# Patient Record
Sex: FEMALE | Race: OTHER | HISPANIC OR LATINO | URBAN - METROPOLITAN AREA
[De-identification: names, ages, dates, MRNs, and addresses within clinical notes are randomized per-mention and may not be internally consistent; named-entity substitution may affect disease eponyms.]

---

## 2017-09-21 ENCOUNTER — EMERGENCY (EMERGENCY)
Facility: HOSPITAL | Age: 22
LOS: 1 days | Discharge: HOME | End: 2017-09-21
Attending: EMERGENCY MEDICINE
Payer: MEDICAID

## 2017-09-21 VITALS
RESPIRATION RATE: 20 BRPM | DIASTOLIC BLOOD PRESSURE: 85 MMHG | TEMPERATURE: 100 F | HEART RATE: 107 BPM | OXYGEN SATURATION: 94 % | WEIGHT: 130.07 LBS | SYSTOLIC BLOOD PRESSURE: 130 MMHG

## 2017-09-21 PROCEDURE — 99053 MED SERV 10PM-8AM 24 HR FAC: CPT

## 2017-09-21 PROCEDURE — 99285 EMERGENCY DEPT VISIT HI MDM: CPT | Mod: 25

## 2017-09-22 VITALS
SYSTOLIC BLOOD PRESSURE: 130 MMHG | OXYGEN SATURATION: 100 % | RESPIRATION RATE: 20 BRPM | HEART RATE: 102 BPM | TEMPERATURE: 100 F | DIASTOLIC BLOOD PRESSURE: 83 MMHG

## 2017-09-22 PROCEDURE — T1013: CPT

## 2017-09-22 PROCEDURE — 94640 AIRWAY INHALATION TREATMENT: CPT

## 2017-09-22 PROCEDURE — 71046 X-RAY EXAM CHEST 2 VIEWS: CPT

## 2017-09-22 PROCEDURE — 71020: CPT | Mod: 26

## 2017-09-22 PROCEDURE — 99284 EMERGENCY DEPT VISIT MOD MDM: CPT | Mod: 25

## 2017-09-22 RX ORDER — ALBUTEROL 90 UG/1
2 AEROSOL, METERED ORAL
Qty: 1 | Refills: 0 | OUTPATIENT
Start: 2017-09-22 | End: 2017-10-22

## 2017-09-22 RX ORDER — AZITHROMYCIN 500 MG/1
1 TABLET, FILM COATED ORAL
Qty: 4 | Refills: 0 | OUTPATIENT
Start: 2017-09-22 | End: 2017-09-26

## 2017-09-22 RX ORDER — IPRATROPIUM/ALBUTEROL SULFATE 18-103MCG
3 AEROSOL WITH ADAPTER (GRAM) INHALATION ONCE
Qty: 0 | Refills: 0 | Status: COMPLETED | OUTPATIENT
Start: 2017-09-22 | End: 2017-09-22

## 2017-09-22 RX ORDER — ACETAMINOPHEN 500 MG
650 TABLET ORAL ONCE
Qty: 0 | Refills: 0 | Status: COMPLETED | OUTPATIENT
Start: 2017-09-22 | End: 2017-09-22

## 2017-09-22 RX ORDER — AZITHROMYCIN 500 MG/1
500 TABLET, FILM COATED ORAL ONCE
Qty: 0 | Refills: 0 | Status: COMPLETED | OUTPATIENT
Start: 2017-09-22 | End: 2017-09-22

## 2017-09-22 RX ADMIN — Medication 3 MILLILITER(S): at 02:31

## 2017-09-22 RX ADMIN — Medication 60 MILLIGRAM(S): at 02:31

## 2017-09-22 RX ADMIN — AZITHROMYCIN 500 MILLIGRAM(S): 500 TABLET, FILM COATED ORAL at 03:47

## 2017-09-22 RX ADMIN — Medication 650 MILLIGRAM(S): at 02:31

## 2017-09-22 RX ADMIN — Medication 3 MILLILITER(S): at 03:47

## 2017-09-22 NOTE — ED PROVIDER NOTE - ATTENDING CONTRIBUTION TO CARE
22 yo F c/o URI s/s with chest tightness and prod brown sputum and fever .  Pt with hx of asthma as a child.  No exposures.  On exam lungs with few scattered exp wheezes. CXR JASON.  Pt improved with neb.  Rx Albuterol MDI, Z-zoltan and po steroids with f/u PMD/Dr. hicks

## 2017-09-22 NOTE — ED PROVIDER NOTE - OBJECTIVE STATEMENT
Pt is a 22yo female with pmhx of asthma c/o chest tightness x today. pt reports when she breaths she has chest tightness without palpitations or cp. pt reports she has had flu like symptoms consisting of body aches, cough with brown sputum. Pt is a 20yo female with pmhx of asthma c/o chest tightness x today. pt reports when she breaths she has chest tightness without palpitations or cp. pt reports she has had flu like symptoms consisting of body aches, cough with brown sputum and fever. pt reports she took nyquil for symptoms. pt denies chills, rash, cp, sob, recent travel, hx of DVT/PE, leg or calf swelling. nkda

## 2017-09-22 NOTE — ED PROVIDER NOTE - PROGRESS NOTE DETAILS
pt improved. lungs CTABL. will rx azithro, prednisone, albuterol. pt advised to follow up with pmd. pt verbalized understanding and agreement with plan and dx will dc

## 2017-09-22 NOTE — ED PROVIDER NOTE - CARDIAC, MLM
+ chest wall tenderness. Normal rate, regular rhythm.  Heart sounds S1, S2.  No murmurs, rubs or gallops.

## 2018-04-04 NOTE — ED PROVIDER NOTE - TIMING
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    PHQ-9 4/7/2017   Total Score 0   Q9: Suicide Ideation Not at all     JESSICA-7 SCORE 4/7/2017   Total Score 0     PHQ-9  English  PHQ-9   Any Language  JESSICA-7  Suicide Assessment Five-step Evaluation and Treatment (SAFE-T)    Problem list and histories reviewed & adjusted, as indicated.  Additional history: as documented    Patient Active Problem List   Diagnosis     ACP (advance care planning)     Tobacco use disorder     Migraine without aura and without status migrainosus, not intractable     Gastroesophageal reflux disease, esophagitis presence not specified     Irritable bowel syndrome with both constipation and diarrhea     Family history of heart disease in female family member before age 65     Family history of heart disease in male family member before age 55     Past Surgical History:   Procedure Laterality Date     ORTHOPEDIC SURGERY  2002    ankle surgery- bone spur removal       Social History   Substance Use Topics     Smoking status: Current Every Day Smoker     Packs/day: 0.50     Years: 15.00     Types: Cigarettes     Smokeless tobacco: Never Used     Alcohol use No     Family History   Problem Relation Age of Onset      "HEART DISEASE Mother      8 stents; onset 40s?     Chronic Obstructive Pulmonary Disease Mother      CANCER Father      mouth and throat cancer     HEART DISEASE Brother      stents; age 37; occlusive disease     Bladder Cancer Brother            Reviewed and updated as needed this visit by clinical staff  Tobacco  Allergies  Meds  Med Hx  Surg Hx  Fam Hx  Soc Hx      Reviewed and updated as needed this visit by Provider         ROS:  CONSTITUTIONAL:NEGATIVE for fever, chills, change in weight  PSYCHIATRIC: POSITIVE for as above    OBJECTIVE:     /80 (BP Location: Right arm, Patient Position: Sitting, Cuff Size: Adult Regular)  Pulse 84  Temp 97.5  F (36.4  C) (Tympanic)  Resp 12  Ht 5' 3.5\" (1.613 m)  Wt 183 lb 6.4 oz (83.2 kg)  SpO2 98%  BMI 31.98 kg/m2  Body mass index is 31.98 kg/(m^2).  GENERAL: alert, no distress and over weight  RESP: lungs clear to auscultation - no rales, rhonchi or wheezes  CV: regular rate and rhythm, normal S1 S2, no S3 or S4, no murmur, click or rub, no peripheral edema and peripheral pulses strong  PSYCH: mentation appears normal, affect normal/bright      ASSESSMENT/PLAN:   (F33.9) Episode of recurrent major depressive disorder, unspecified depression episode severity (H)  (primary encounter diagnosis)  Plan: sertraline (ZOLOFT) 50 MG tablet, MENTAL HEALTH        REFERRAL  - Adult; Outpatient Treatment;         Individual/Couples/Family/Group Therapy/Health         Psychology; Range: [facility] [phone]; We will         contact you to schedule the appointment or         please call ...    (F41.9) Anxiety  Plan: sertraline (ZOLOFT) 50 MG tablet, MENTAL HEALTH        REFERRAL  - Adult; Outpatient Treatment;         Individual/Couples/Family/Group Therapy/Health         Psychology; Range: [facility] [phone]; We will         contact you to schedule the appointment or     "     please call ...      (F17.200) Tobacco use disorder  (Z71.6) Encounter for tobacco use cessation counseling      (G43.009) Migraine without aura and without status migrainosus, not intractable  Comment: asked for refill at close of visit - done  Plan: SUMAtriptan (IMITREX) 100 MG tablet    Patient Instructions   Start Zoloft 25 mg daily increasing to 50 mg after 1-2 weeks if tolerating and appropriate.  Referral for counseling services here.  See list below as well.  Follow up 1 month, sooner if concerns.    Psychologists/ counselors  [place]  [phone]  Dr. Harvey Chavez [phone]  [facility]  [phone]  [facility] [phone]  Octavio Garcia  [phone]   [facility]  [phone]  (kids)  [facility] [phone]  (teens)  [facility]   [phone]  Counseling  [facility] [phone]  [facility] [phone]  [facility]      [phone]  [facility] [phone]  [facility]  [phone]  [facility]     [phone]   [facility] counseling [phone]  Fabby Mojo  [phone]  Ced Coyne [phone]  Cara Davis [phone]  [facility]     [phone]  [facility]     [phone]  Harpal Flynn  [phone]  [facility]     [phone]  [facility] Dr. HARPAL Zavala     [phone]  [facility]     [phone]                        Kendra Bailey MD  [facility]   sudden onset

## 2018-08-17 ENCOUNTER — EMERGENCY (EMERGENCY)
Facility: HOSPITAL | Age: 23
LOS: 1 days | Discharge: OTHER INSTITUTION | End: 2018-08-17
Attending: EMERGENCY MEDICINE
Payer: COMMERCIAL

## 2018-08-17 ENCOUNTER — INPATIENT (INPATIENT)
Facility: HOSPITAL | Age: 23
LOS: 9 days | Discharge: HOME | End: 2018-08-27
Attending: PSYCHIATRY & NEUROLOGY | Admitting: PSYCHIATRY & NEUROLOGY
Payer: COMMERCIAL

## 2018-08-17 VITALS — HEIGHT: 68 IN | WEIGHT: 139.99 LBS

## 2018-08-17 VITALS
HEART RATE: 62 BPM | OXYGEN SATURATION: 100 % | TEMPERATURE: 97 F | SYSTOLIC BLOOD PRESSURE: 125 MMHG | RESPIRATION RATE: 18 BRPM | DIASTOLIC BLOOD PRESSURE: 89 MMHG

## 2018-08-17 DIAGNOSIS — F33.2 MAJOR DEPRESSIVE DISORDER, RECURRENT SEVERE WITHOUT PSYCHOTIC FEATURES: ICD-10-CM

## 2018-08-17 DIAGNOSIS — F33.9 MAJOR DEPRESSIVE DISORDER, RECURRENT, UNSPECIFIED: ICD-10-CM

## 2018-08-17 PROBLEM — J45.909 UNSPECIFIED ASTHMA, UNCOMPLICATED: Chronic | Status: ACTIVE | Noted: 2017-09-22

## 2018-08-17 LAB
ALBUMIN SERPL ELPH-MCNC: 4.8 G/DL — SIGNIFICANT CHANGE UP (ref 3.3–5.2)
ALP SERPL-CCNC: 45 U/L — SIGNIFICANT CHANGE UP (ref 40–120)
ALT FLD-CCNC: 14 U/L — SIGNIFICANT CHANGE UP
AMPHET UR-MCNC: NEGATIVE — SIGNIFICANT CHANGE UP
ANION GAP SERPL CALC-SCNC: 14 MMOL/L — SIGNIFICANT CHANGE UP (ref 5–17)
APAP SERPL-MCNC: <7.5 UG/ML — LOW (ref 10–26)
AST SERPL-CCNC: 15 U/L — SIGNIFICANT CHANGE UP
BARBITURATES UR SCN-MCNC: NEGATIVE — SIGNIFICANT CHANGE UP
BASOPHILS # BLD AUTO: 0 K/UL — SIGNIFICANT CHANGE UP (ref 0–0.2)
BASOPHILS NFR BLD AUTO: 0.2 % — SIGNIFICANT CHANGE UP (ref 0–2)
BENZODIAZ UR-MCNC: NEGATIVE — SIGNIFICANT CHANGE UP
BILIRUB SERPL-MCNC: 0.5 MG/DL — SIGNIFICANT CHANGE UP (ref 0.4–2)
BUN SERPL-MCNC: 12 MG/DL — SIGNIFICANT CHANGE UP (ref 8–20)
CALCIUM SERPL-MCNC: 9.3 MG/DL — SIGNIFICANT CHANGE UP (ref 8.6–10.2)
CHLORIDE SERPL-SCNC: 102 MMOL/L — SIGNIFICANT CHANGE UP (ref 98–107)
CO2 SERPL-SCNC: 23 MMOL/L — SIGNIFICANT CHANGE UP (ref 22–29)
COCAINE METAB.OTHER UR-MCNC: NEGATIVE — SIGNIFICANT CHANGE UP
CREAT SERPL-MCNC: 0.72 MG/DL — SIGNIFICANT CHANGE UP (ref 0.5–1.3)
EOSINOPHIL # BLD AUTO: 0.1 K/UL — SIGNIFICANT CHANGE UP (ref 0–0.5)
EOSINOPHIL NFR BLD AUTO: 1.6 % — SIGNIFICANT CHANGE UP (ref 0–6)
ETHANOL SERPL-MCNC: <10 MG/DL — SIGNIFICANT CHANGE UP
GLUCOSE SERPL-MCNC: 114 MG/DL — SIGNIFICANT CHANGE UP (ref 70–115)
HCG UR QL: NEGATIVE — SIGNIFICANT CHANGE UP
HCT VFR BLD CALC: 37.8 % — SIGNIFICANT CHANGE UP (ref 37–47)
HGB BLD-MCNC: 12.4 G/DL — SIGNIFICANT CHANGE UP (ref 12–16)
LYMPHOCYTES # BLD AUTO: 2.7 K/UL — SIGNIFICANT CHANGE UP (ref 1–4.8)
LYMPHOCYTES # BLD AUTO: 32.5 % — SIGNIFICANT CHANGE UP (ref 20–55)
MCHC RBC-ENTMCNC: 26.2 PG — LOW (ref 27–31)
MCHC RBC-ENTMCNC: 32.8 G/DL — SIGNIFICANT CHANGE UP (ref 32–36)
MCV RBC AUTO: 79.7 FL — LOW (ref 81–99)
METHADONE UR-MCNC: NEGATIVE — SIGNIFICANT CHANGE UP
MONOCYTES # BLD AUTO: 0.7 K/UL — SIGNIFICANT CHANGE UP (ref 0–0.8)
MONOCYTES NFR BLD AUTO: 8.3 % — SIGNIFICANT CHANGE UP (ref 3–10)
NEUTROPHILS # BLD AUTO: 4.8 K/UL — SIGNIFICANT CHANGE UP (ref 1.8–8)
NEUTROPHILS NFR BLD AUTO: 57.3 % — SIGNIFICANT CHANGE UP (ref 37–73)
OPIATES UR-MCNC: NEGATIVE — SIGNIFICANT CHANGE UP
PCP SPEC-MCNC: SIGNIFICANT CHANGE UP
PCP UR-MCNC: NEGATIVE — SIGNIFICANT CHANGE UP
PLATELET # BLD AUTO: 278 K/UL — SIGNIFICANT CHANGE UP (ref 150–400)
POTASSIUM SERPL-MCNC: 3.5 MMOL/L — SIGNIFICANT CHANGE UP (ref 3.5–5.3)
POTASSIUM SERPL-SCNC: 3.5 MMOL/L — SIGNIFICANT CHANGE UP (ref 3.5–5.3)
PROT SERPL-MCNC: 7.9 G/DL — SIGNIFICANT CHANGE UP (ref 6.6–8.7)
RBC # BLD: 4.74 M/UL — SIGNIFICANT CHANGE UP (ref 4.4–5.2)
RBC # FLD: 14.8 % — SIGNIFICANT CHANGE UP (ref 11–15.6)
SALICYLATES SERPL-MCNC: <0.6 MG/DL — LOW (ref 10–20)
SODIUM SERPL-SCNC: 139 MMOL/L — SIGNIFICANT CHANGE UP (ref 135–145)
THC UR QL: NEGATIVE — SIGNIFICANT CHANGE UP
WBC # BLD: 8.3 K/UL — SIGNIFICANT CHANGE UP (ref 4.8–10.8)
WBC # FLD AUTO: 8.3 K/UL — SIGNIFICANT CHANGE UP (ref 4.8–10.8)

## 2018-08-17 PROCEDURE — 99285 EMERGENCY DEPT VISIT HI MDM: CPT

## 2018-08-17 PROCEDURE — 80053 COMPREHEN METABOLIC PANEL: CPT

## 2018-08-17 PROCEDURE — 80307 DRUG TEST PRSMV CHEM ANLYZR: CPT

## 2018-08-17 PROCEDURE — 36415 COLL VENOUS BLD VENIPUNCTURE: CPT

## 2018-08-17 PROCEDURE — 93010 ELECTROCARDIOGRAM REPORT: CPT

## 2018-08-17 PROCEDURE — 85027 COMPLETE CBC AUTOMATED: CPT

## 2018-08-17 PROCEDURE — 81025 URINE PREGNANCY TEST: CPT

## 2018-08-17 PROCEDURE — 93005 ELECTROCARDIOGRAM TRACING: CPT

## 2018-08-17 PROCEDURE — T1013: CPT

## 2018-08-17 RX ORDER — HALOPERIDOL DECANOATE 100 MG/ML
5 INJECTION INTRAMUSCULAR ONCE
Qty: 0 | Refills: 0 | Status: DISCONTINUED | OUTPATIENT
Start: 2018-08-17 | End: 2018-08-27

## 2018-08-17 RX ORDER — HALOPERIDOL DECANOATE 100 MG/ML
5 INJECTION INTRAMUSCULAR EVERY 6 HOURS
Qty: 0 | Refills: 0 | Status: DISCONTINUED | OUTPATIENT
Start: 2018-08-17 | End: 2018-08-27

## 2018-08-17 RX ORDER — QUETIAPINE FUMARATE 200 MG/1
100 TABLET, FILM COATED ORAL AT BEDTIME
Qty: 0 | Refills: 0 | Status: DISCONTINUED | OUTPATIENT
Start: 2018-08-17 | End: 2018-08-19

## 2018-08-17 NOTE — ED BEHAVIORAL HEALTH ASSESSMENT NOTE - SUMMARY
23 y/o F, domiciled with family, terminated from employment 2 days ago, unclear hx of psychiatric d/o, previously admitted to [facility] in 2013 for SI and insomnia, with pmhx of asthma and ovarian tumor, denies legal hx/substance abuse, denies abuse/trauma, presents to the ED c/o aggressive and bizarre behavior. She endorses feeling very stressed at home and has been considering suicide, without a clear plan/intent. She denies avh. Family reports irritability/aggression/insomnia, pt talking to herself. Will benefit from inpatient hospitalization on DOCS.

## 2018-08-17 NOTE — ED ADULT NURSE NOTE - NSIMPLEMENTINTERV_GEN_ALL_ED
Implemented All Universal Safety Interventions:  [place] to call system. Call bell, personal items and telephone within reach. Instruct patient to call for assistance. Room bathroom lighting operational. Non-slip footwear when patient is off stretcher. Physically safe environment: no spills, clutter or unnecessary equipment. Stretcher in lowest position, wheels locked, appropriate side rails in place.

## 2018-08-17 NOTE — ED BEHAVIORAL HEALTH ASSESSMENT NOTE - RISK ASSESSMENT
high - pt with impaired reasoning, global insomnia, irritability, aggression toward family, not currently in treattment
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
## 2018-08-17 NOTE — ED STATDOCS - MEDICAL DECISION MAKING DETAILS
Pt with aggressive behavior and difficulty sleeping, will obtain labs, urine tox, and  consult.  Reeval

## 2018-08-17 NOTE — ED ADULT NURSE REASSESSMENT NOTE - REASSESS COMMUNICATION
family informed/Pt  came to ER  requesting to know sitatus with his wife. Informed through  Pt will be going to inpatient however no bed has been found yet. He will be made aware when situation changes

## 2018-08-17 NOTE — ED BEHAVIORAL HEALTH NOTE - BEHAVIORAL HEALTH NOTE
TRINYNote: pt accepted by Dr Jonatan Hua3 at [facility] on a DOCS status. [facility] called(Betty) ,marnie ETA 17:30 .  17:45 [facility] crew unable to take pt given that their stretcher did not have the buckle guards in place. As per Franki (marnie crew) closest marnie is at [facility]. They will return to p/u pt aprox. in one hour. Worker left message informing SWr Sugar about the delay.   Auth needed for transfer ,worker will attempt to obtain it asap. SW to follow.

## 2018-08-17 NOTE — ED BEHAVIORAL HEALTH ASSESSMENT NOTE - DESCRIPTION
tearful, depressed see medical hpi lives with family, recently terminated from job at [employer]

## 2018-08-17 NOTE — ED BEHAVIORAL HEALTH ASSESSMENT NOTE - DETAILS
pending bed availability family with  see hpi generalized aggression, general feeling of irritability

## 2018-08-17 NOTE — ED STATDOCS - PROGRESS NOTE DETAILS
psych evaluated the patient, will transfer the patient to [facility], accepted by

## 2018-08-17 NOTE — ED ADULT NURSE REASSESSMENT NOTE - CONDITION
[facility] transport team arrived to take pt to [facility]. They arrived without buckle guard and were unable to transport pt. Another crew will need to  be sent to transport pt. Pt interacting with other peers/unchanged

## 2018-08-18 VITALS — HEIGHT: 68 IN | WEIGHT: 128.09 LBS | TEMPERATURE: 98 F

## 2018-08-18 LAB
CHOLEST SERPL-MCNC: 139 MG/DL — SIGNIFICANT CHANGE UP (ref 120–199)
HDLC SERPL-MCNC: 56 MG/DL — SIGNIFICANT CHANGE UP (ref 45–65)
LIPID PNL WITH DIRECT LDL SERPL: 77 MG/DL — SIGNIFICANT CHANGE UP
TRIGL SERPL-MCNC: 79 MG/DL — SIGNIFICANT CHANGE UP (ref 10–149)
TSH SERPL-MCNC: 1.66 UIU/ML — SIGNIFICANT CHANGE UP (ref 0.27–4.2)

## 2018-08-18 PROCEDURE — 99232 SBSQ HOSP IP/OBS MODERATE 35: CPT

## 2018-08-18 RX ADMIN — QUETIAPINE FUMARATE 100 MILLIGRAM(S): 200 TABLET, FILM COATED ORAL at 21:00

## 2018-08-18 NOTE — CHART NOTE - NSCHARTNOTEFT_GEN_A_CORE
Screening Medical Evaluation  Patient Admitted from: [facility] ED    [facility] admitting diagnosis: Recurrent major depressive disorder    PAST MEDICAL & SURGICAL HISTORY:  Asthma  Ovarian tumor: removed 5 years ago  No significant past surgical history        Allergies    No Known Allergies    Intolerances        Social History:     FAMILY HISTORY:      MEDICATIONS  (STANDING):  QUEtiapine 100 milliGRAM(s) Oral at bedtime    MEDICATIONS  (PRN):  haloperidol     Tablet 5 milliGRAM(s) Oral every 6 hours PRN Agitation  haloperidol    Injectable 5 milliGRAM(s) IntraMuscular once PRN Agitation  LORazepam     Tablet 2 milliGRAM(s) Oral every 6 hours PRN Agitation  LORazepam   Injectable 2 milliGRAM(s) IntraMuscular once PRN Agitation      Vital Signs Last 24 Hrs  T(C): 36.6 (18 Aug 2018 19:09), Max: 36.6 (18 Aug 2018 19:09)  T(F): 97.9 (18 Aug 2018 19:09), Max: 97.9 (18 Aug 2018 19:09)  HR: 82 (18 Aug 2018 19:09)  BP: 135/84 (18 Aug 2018 19:09)  RR: 18 (18 Aug 2018 19:09)  SpO2: --  CAPILLARY BLOOD GLUCOSE            PHYSICAL EXAM:  GENERAL: NAD, well-developed  HEAD:  Atraumatic, Normocephalic  EYES: EOMI, PERRLA, conjunctiva and sclera clear  NECK: Supple.  CHEST/LUNG: Clear to auscultation bilaterally; No wheezes noted.  HEART: Regular rate and rhythm; +s1 and +s2; No murmurs, rubs, or gallops  ABDOMEN: Soft, Nontender, Nondistended; Normoactive Bowel sounds present  EXTREMITIES:  2+ Peripheral Pulses, No cyanosis, or edema  PSYCH: AAOx3  NEUROLOGY: non-focal  SKIN: No rashes or lesions    LABS:                        12.4   8.3   )-----------( 278      ( 17 Aug 2018 13:53 )             37.8     08-17    139  |  102  |  12.0  ----------------------------<  114  3.5   |  23.0  |  0.72    Ca    9.3      17 Aug 2018 13:53    TPro  7.9  /  Alb  4.8  /  TBili  0.5  /  DBili  x   /  AST  15  /  ALT  14  /  AlkPhos  45  08-17              RADIOLOGY & ADDITIONAL TESTS:    Assessment and Plan:  22 year old female presenting from [facility] ED to [facility] with admitting diagnosis of Recurrent major depressive disorder with PMH of asthma (does not recall last use of inhaler, never had been intubated) and HTN. Denies any medical concerns at this time. Denies any fever, chills, headache, chest pain, SOB, abdominal pain, N/V/D/C.  1)	Recurrent major depressive disorder: Follow care plan as per primary psych team.

## 2018-08-19 PROCEDURE — 99232 SBSQ HOSP IP/OBS MODERATE 35: CPT

## 2018-08-19 RX ORDER — QUETIAPINE FUMARATE 200 MG/1
150 TABLET, FILM COATED ORAL AT BEDTIME
Qty: 0 | Refills: 0 | Status: DISCONTINUED | OUTPATIENT
Start: 2018-08-19 | End: 2018-08-20

## 2018-08-19 RX ADMIN — QUETIAPINE FUMARATE 150 MILLIGRAM(S): 200 TABLET, FILM COATED ORAL at 21:20

## 2018-08-20 PROCEDURE — 99232 SBSQ HOSP IP/OBS MODERATE 35: CPT

## 2018-08-20 RX ORDER — QUETIAPINE FUMARATE 200 MG/1
200 TABLET, FILM COATED ORAL AT BEDTIME
Qty: 0 | Refills: 0 | Status: DISCONTINUED | OUTPATIENT
Start: 2018-08-20 | End: 2018-08-21

## 2018-08-20 RX ADMIN — QUETIAPINE FUMARATE 200 MILLIGRAM(S): 200 TABLET, FILM COATED ORAL at 21:49

## 2018-08-21 PROCEDURE — 99232 SBSQ HOSP IP/OBS MODERATE 35: CPT

## 2018-08-21 RX ORDER — QUETIAPINE FUMARATE 200 MG/1
250 TABLET, FILM COATED ORAL AT BEDTIME
Qty: 0 | Refills: 0 | Status: DISCONTINUED | OUTPATIENT
Start: 2018-08-21 | End: 2018-08-22

## 2018-08-21 RX ADMIN — QUETIAPINE FUMARATE 250 MILLIGRAM(S): 200 TABLET, FILM COATED ORAL at 21:15

## 2018-08-22 PROCEDURE — 99233 SBSQ HOSP IP/OBS HIGH 50: CPT

## 2018-08-22 RX ORDER — QUETIAPINE FUMARATE 200 MG/1
300 TABLET, FILM COATED ORAL AT BEDTIME
Qty: 0 | Refills: 0 | Status: DISCONTINUED | OUTPATIENT
Start: 2018-08-22 | End: 2018-08-24

## 2018-08-22 RX ADMIN — QUETIAPINE FUMARATE 300 MILLIGRAM(S): 200 TABLET, FILM COATED ORAL at 20:38

## 2018-08-23 PROCEDURE — 99231 SBSQ HOSP IP/OBS SF/LOW 25: CPT

## 2018-08-23 RX ADMIN — QUETIAPINE FUMARATE 300 MILLIGRAM(S): 200 TABLET, FILM COATED ORAL at 21:39

## 2018-08-24 PROCEDURE — 99232 SBSQ HOSP IP/OBS MODERATE 35: CPT

## 2018-08-24 RX ORDER — QUETIAPINE FUMARATE 200 MG/1
350 TABLET, FILM COATED ORAL AT BEDTIME
Qty: 0 | Refills: 0 | Status: DISCONTINUED | OUTPATIENT
Start: 2018-08-24 | End: 2018-08-27

## 2018-08-24 RX ADMIN — QUETIAPINE FUMARATE 350 MILLIGRAM(S): 200 TABLET, FILM COATED ORAL at 21:53

## 2018-08-25 RX ADMIN — QUETIAPINE FUMARATE 350 MILLIGRAM(S): 200 TABLET, FILM COATED ORAL at 21:03

## 2018-08-26 RX ADMIN — QUETIAPINE FUMARATE 350 MILLIGRAM(S): 200 TABLET, FILM COATED ORAL at 21:12

## 2018-08-27 VITALS — HEART RATE: 96 BPM | SYSTOLIC BLOOD PRESSURE: 119 MMHG | DIASTOLIC BLOOD PRESSURE: 78 MMHG | TEMPERATURE: 97 F

## 2018-08-27 PROCEDURE — 99239 HOSP IP/OBS DSCHRG MGMT >30: CPT

## 2018-08-27 RX ORDER — QUETIAPINE FUMARATE 200 MG/1
400 TABLET, FILM COATED ORAL AT BEDTIME
Qty: 0 | Refills: 0 | Status: DISCONTINUED | OUTPATIENT
Start: 2018-08-27 | End: 2018-08-27

## 2018-08-27 RX ORDER — QUETIAPINE FUMARATE 200 MG/1
1 TABLET, FILM COATED ORAL
Qty: 30 | Refills: 0 | OUTPATIENT
Start: 2018-08-27 | End: 2018-09-25

## 2020-03-24 PROBLEM — Z00.00 ENCOUNTER FOR PREVENTIVE HEALTH EXAMINATION: Status: ACTIVE | Noted: 2020-03-24

## 2020-04-03 ENCOUNTER — APPOINTMENT (OUTPATIENT)
Dept: GASTROENTEROLOGY | Facility: CLINIC | Age: 25
End: 2020-04-03
Payer: COMMERCIAL

## 2020-06-23 ENCOUNTER — APPOINTMENT (OUTPATIENT)
Dept: GASTROENTEROLOGY | Facility: CLINIC | Age: 25
End: 2020-06-23
Payer: COMMERCIAL

## 2020-06-23 VITALS
SYSTOLIC BLOOD PRESSURE: 125 MMHG | DIASTOLIC BLOOD PRESSURE: 90 MMHG | RESPIRATION RATE: 14 BRPM | TEMPERATURE: 98.3 F | HEART RATE: 72 BPM | WEIGHT: 178 LBS | HEIGHT: 68 IN | BODY MASS INDEX: 26.98 KG/M2 | OXYGEN SATURATION: 99 %

## 2020-06-23 DIAGNOSIS — Z78.9 OTHER SPECIFIED HEALTH STATUS: ICD-10-CM

## 2020-06-23 DIAGNOSIS — K76.9 LIVER DISEASE, UNSPECIFIED: ICD-10-CM

## 2020-06-23 PROCEDURE — 99203 OFFICE O/P NEW LOW 30 MIN: CPT

## 2020-06-23 NOTE — REASON FOR VISIT
[Consultation] : a consultation visit [ Service] : provided by  Service [FreeTextEntry1] : [id number] [FreeTextEntry2] : Marilu [TWNoteComboBox1] : Stateless

## 2020-06-23 NOTE — ASSESSMENT
[FreeTextEntry1] : It is a possibility of benign liver lesion versus cystic lesion. At this time we will perform the blood work including AFP and CA 19-9 level and order an MRI of the liver. We will followup in 2 months.\par \par Geremias Krause MD\par Gastroenterology \par \par \par

## 2020-06-23 NOTE — HISTORY OF PRESENT ILLNESS
[Heartburn] : denies heartburn [Nausea] : denies nausea [Vomiting] : denies vomiting [Constipation] : denies constipation [Diarrhea] : denies diarrhea [Yellow Skin Or Eyes (Jaundice)] : denies jaundice [Abdominal Pain] : denies abdominal pain [Abdominal Swelling] : denies abdominal swelling [Rectal Pain] : denies rectal pain [de-identified] : The patient was referred for evaluation for a left liver lobe lesion. The patient has undergone regular physical examination and is known to have elevated liver tests. Then ultrasound abdomen was performed which revealed a 1.8 x 1.8 x 1.7 cm hypoechoic lesion in the left liver lobe. She was also noted to have fatty liver. She has no complaints otherwise. No family history of any liver disease. She denies any alcohol consumption. No tobacco use. Her recent liver tests are unremarkable. The prior AST was 49 which is 23 now. ALT was 79 and 32 now. I do not have any other blood work.No oral contraceptive medications.

## 2020-06-23 NOTE — PHYSICAL EXAM
[General Appearance - Alert] : alert [General Appearance - In No Acute Distress] : in no acute distress [Sclera] : the sclera and conjunctiva were normal [PERRL With Normal Accommodation] : pupils were equal in size, round, and reactive to light [Extraocular Movements] : extraocular movements were intact [Outer Ear] : the ears and nose were normal in appearance [Oropharynx] : the oropharynx was normal [Neck Cervical Mass (___cm)] : no neck mass was observed [Jugular Venous Distention Increased] : there was no jugular-venous distention [Neck Appearance] : the appearance of the neck was normal [Thyroid Diffuse Enlargement] : the thyroid was not enlarged [Thyroid Nodule] : there were no palpable thyroid nodules [Auscultation Breath Sounds / Voice Sounds] : lungs were clear to auscultation bilaterally [Heart Sounds] : normal S1 and S2 [Heart Rate And Rhythm] : heart rate was normal and rhythm regular [Murmurs] : no murmurs [Heart Sounds Gallop] : no gallops [Heart Sounds Pericardial Friction Rub] : no pericardial rub [Full Pulse] : the pedal pulses are present [Edema] : there was no peripheral edema [Cervical Lymph Nodes Enlarged Posterior Bilaterally] : posterior cervical [Axillary Lymph Nodes Enlarged Bilaterally] : axillary [Supraclavicular Lymph Nodes Enlarged Bilaterally] : supraclavicular [Cervical Lymph Nodes Enlarged Anterior Bilaterally] : anterior cervical [Femoral Lymph Nodes Enlarged Bilaterally] : femoral [Inguinal Lymph Nodes Enlarged Bilaterally] : inguinal [No CVA Tenderness] : no ~M costovertebral angle tenderness [No Spinal Tenderness] : no spinal tenderness [Abnormal Walk] : normal gait [Musculoskeletal - Swelling] : no joint swelling seen [Nail Clubbing] : no clubbing  or cyanosis of the fingernails [Motor Tone] : muscle strength and tone were normal [Skin Color & Pigmentation] : normal skin color and pigmentation [Skin Turgor] : normal skin turgor [] : no rash [No Focal Deficits] : no focal deficits [Impaired Insight] : insight and judgment were intact [Oriented To Time, Place, And Person] : oriented to person, place, and time [Affect] : the affect was normal

## 2020-06-26 ENCOUNTER — OUTPATIENT (OUTPATIENT)
Dept: OUTPATIENT SERVICES | Facility: HOSPITAL | Age: 25
LOS: 1 days | End: 2020-06-26

## 2020-06-26 ENCOUNTER — APPOINTMENT (OUTPATIENT)
Dept: MRI IMAGING | Facility: CLINIC | Age: 25
End: 2020-06-26
Payer: COMMERCIAL

## 2020-06-26 ENCOUNTER — RESULT REVIEW (OUTPATIENT)
Age: 25
End: 2020-06-26

## 2020-06-26 DIAGNOSIS — K76.9 LIVER DISEASE, UNSPECIFIED: ICD-10-CM

## 2020-06-26 PROCEDURE — 74183 MRI ABD W/O CNTR FLWD CNTR: CPT | Mod: 26

## 2020-07-07 LAB
ALBUMIN SERPL ELPH-MCNC: 4.9 G/DL
ALP BLD-CCNC: 50 U/L
ALT SERPL-CCNC: 28 U/L
ANION GAP SERPL CALC-SCNC: 13 MMOL/L
AST SERPL-CCNC: 21 U/L
BASOPHILS # BLD AUTO: 0.04 K/UL
BASOPHILS NFR BLD AUTO: 0.6 %
BILIRUB SERPL-MCNC: 0.2 MG/DL
BUN SERPL-MCNC: 13 MG/DL
CALCIUM SERPL-MCNC: 9.5 MG/DL
CANCER AG19-9 SERPL-ACNC: 8 U/ML
CEA SERPL-MCNC: 0.7 NG/ML
CHLORIDE SERPL-SCNC: 102 MMOL/L
CHOLEST SERPL-MCNC: 156 MG/DL
CHOLEST/HDLC SERPL: 3.4 RATIO
CO2 SERPL-SCNC: 23 MMOL/L
CREAT SERPL-MCNC: 0.78 MG/DL
EOSINOPHIL # BLD AUTO: 0.3 K/UL
EOSINOPHIL NFR BLD AUTO: 4.1 %
ESTIMATED AVERAGE GLUCOSE: 108 MG/DL
GLUCOSE SERPL-MCNC: 94 MG/DL
HBA1C MFR BLD HPLC: 5.4 %
HBV CORE IGG+IGM SER QL: NONREACTIVE
HBV SURFACE AB SER QL: NONREACTIVE
HBV SURFACE AG SER QL: NONREACTIVE
HCT VFR BLD CALC: 40.2 %
HCV AB SER QL: NONREACTIVE
HCV S/CO RATIO: 0.1 S/CO
HDLC SERPL-MCNC: 46 MG/DL
HEPATITIS A IGG ANTIBODY: REACTIVE
HGB BLD-MCNC: 12.8 G/DL
IMM GRANULOCYTES NFR BLD AUTO: 0.1 %
LDLC SERPL CALC-MCNC: 87 MG/DL
LYMPHOCYTES # BLD AUTO: 3.16 K/UL
LYMPHOCYTES NFR BLD AUTO: 43.7 %
MAN DIFF?: NORMAL
MCHC RBC-ENTMCNC: 27.1 PG
MCHC RBC-ENTMCNC: 31.8 GM/DL
MCV RBC AUTO: 85.2 FL
MONOCYTES # BLD AUTO: 0.59 K/UL
MONOCYTES NFR BLD AUTO: 8.2 %
NEUTROPHILS # BLD AUTO: 3.13 K/UL
NEUTROPHILS NFR BLD AUTO: 43.3 %
PLATELET # BLD AUTO: 307 K/UL
POTASSIUM SERPL-SCNC: 4.4 MMOL/L
PROT SERPL-MCNC: 7.5 G/DL
RBC # BLD: 4.72 M/UL
RBC # FLD: 13.7 %
SODIUM SERPL-SCNC: 138 MMOL/L
TRIGL SERPL-MCNC: 116 MG/DL
TSH SERPL-ACNC: 1.21 UIU/ML
WBC # FLD AUTO: 7.23 K/UL

## 2020-08-14 ENCOUNTER — APPOINTMENT (OUTPATIENT)
Dept: GASTROENTEROLOGY | Facility: CLINIC | Age: 25
End: 2020-08-14
Payer: COMMERCIAL

## 2020-08-14 VITALS
OXYGEN SATURATION: 98 % | TEMPERATURE: 97.9 F | WEIGHT: 168 LBS | SYSTOLIC BLOOD PRESSURE: 110 MMHG | DIASTOLIC BLOOD PRESSURE: 70 MMHG | BODY MASS INDEX: 25.46 KG/M2 | RESPIRATION RATE: 15 BRPM | HEART RATE: 70 BPM | HEIGHT: 68 IN

## 2020-08-14 DIAGNOSIS — K76.0 FATTY (CHANGE OF) LIVER, NOT ELSEWHERE CLASSIFIED: ICD-10-CM

## 2020-08-14 PROCEDURE — 99213 OFFICE O/P EST LOW 20 MIN: CPT

## 2020-08-14 NOTE — PHYSICAL EXAM
[General Appearance - Alert] : alert [Sclera] : the sclera and conjunctiva were normal [General Appearance - In No Acute Distress] : in no acute distress [PERRL With Normal Accommodation] : pupils were equal in size, round, and reactive to light [Extraocular Movements] : extraocular movements were intact [Outer Ear] : the ears and nose were normal in appearance [Oropharynx] : the oropharynx was normal [Neck Appearance] : the appearance of the neck was normal [Jugular Venous Distention Increased] : there was no jugular-venous distention [Neck Cervical Mass (___cm)] : no neck mass was observed [Thyroid Diffuse Enlargement] : the thyroid was not enlarged [Thyroid Nodule] : there were no palpable thyroid nodules [Auscultation Breath Sounds / Voice Sounds] : lungs were clear to auscultation bilaterally [Heart Rate And Rhythm] : heart rate was normal and rhythm regular [Heart Sounds] : normal S1 and S2 [Murmurs] : no murmurs [Heart Sounds Gallop] : no gallops [Full Pulse] : the pedal pulses are present [Heart Sounds Pericardial Friction Rub] : no pericardial rub [Edema] : there was no peripheral edema [Cervical Lymph Nodes Enlarged Anterior Bilaterally] : anterior cervical [Cervical Lymph Nodes Enlarged Posterior Bilaterally] : posterior cervical [Axillary Lymph Nodes Enlarged Bilaterally] : axillary [Supraclavicular Lymph Nodes Enlarged Bilaterally] : supraclavicular [Femoral Lymph Nodes Enlarged Bilaterally] : femoral [Inguinal Lymph Nodes Enlarged Bilaterally] : inguinal [No CVA Tenderness] : no ~M costovertebral angle tenderness [No Spinal Tenderness] : no spinal tenderness [Abnormal Walk] : normal gait [Nail Clubbing] : no clubbing  or cyanosis of the fingernails [Musculoskeletal - Swelling] : no joint swelling seen [Skin Color & Pigmentation] : normal skin color and pigmentation [Motor Tone] : muscle strength and tone were normal [Skin Turgor] : normal skin turgor [No Focal Deficits] : no focal deficits [Impaired Insight] : insight and judgment were intact [Oriented To Time, Place, And Person] : oriented to person, place, and time [Affect] : the affect was normal [Bowel Sounds] : normal bowel sounds [Abdomen Soft] : soft [Abdomen Tenderness] : non-tender [] : no hepato-splenomegaly [Abdomen Mass (___ Cm)] : no abdominal mass palpated

## 2020-08-14 NOTE — HISTORY OF PRESENT ILLNESS
[de-identified] : The patient arrived for a followup. Patient was evaluated in the past for abnormal ultrasound of the liver and evidence of fatty liver. She has no complaints. She underwent MRI of the abdomen which revealed just fatty liver. LFTs are normal. No evidence of hepatitis B or C. Lipid panel and hemoglobin A1c is normal. TSH is normal. Patient is not a smoker. She does not drink alcohol. No family history of fatty liver or liver disease.

## 2020-08-14 NOTE — ASSESSMENT
[FreeTextEntry1] : The patient is doing well. I have recommended regular exercise and weight loss. We will evaluate her in 4- 5 months and perform ultrasound of abdomen for followup of fatty liver. If she still has persistent fatty liver, I would recommend vitamin E supplementation.\par \par Geremias Krause MD\par Gastroenterology \par \par

## 2022-08-01 NOTE — ED BEHAVIORAL HEALTH ASSESSMENT NOTE - NS ED BHA ED COURSE UTILIZATION OF 1 TO 1 IN ED YN
JIHAN WITH [facility] LEFT A VOICEMAIL ON 7/28/2022  PM REGARDING NEEDING TO SCHEDULE DELIVERY OF XOLAIR FOR PATIENT.   
Returned call to [facility].  Per Lauryn delivery scheduled for 8-3-22.  
No

## 2022-12-28 ENCOUNTER — EMERGENCY (EMERGENCY)
Facility: HOSPITAL | Age: 27
LOS: 1 days | Discharge: HOME | End: 2022-12-28
Attending: EMERGENCY MEDICINE
Payer: COMMERCIAL

## 2022-12-28 VITALS
RESPIRATION RATE: 20 BRPM | TEMPERATURE: 98 F | WEIGHT: 149.91 LBS | SYSTOLIC BLOOD PRESSURE: 124 MMHG | DIASTOLIC BLOOD PRESSURE: 86 MMHG | HEIGHT: 65 IN | HEART RATE: 77 BPM | OXYGEN SATURATION: 99 %

## 2022-12-28 LAB
APPEARANCE UR: ABNORMAL
BACTERIA # UR AUTO: ABNORMAL
BILIRUB UR-MCNC: ABNORMAL
COLOR SPEC: ABNORMAL
DIFF PNL FLD: ABNORMAL
EPI CELLS # UR: SIGNIFICANT CHANGE UP
GLUCOSE UR QL: NEGATIVE — SIGNIFICANT CHANGE UP
HCG UR QL: NEGATIVE — SIGNIFICANT CHANGE UP
KETONES UR-MCNC: NEGATIVE — SIGNIFICANT CHANGE UP
LEUKOCYTE ESTERASE UR-ACNC: ABNORMAL
NITRITE UR-MCNC: POSITIVE
PH UR: 6 — SIGNIFICANT CHANGE UP (ref 5–8)
PROT UR-MCNC: 30 MG/DL
RBC CASTS # UR COMP ASSIST: >50 /HPF (ref 0–4)
SP GR SPEC: 1 — LOW (ref 1.01–1.02)
UROBILINOGEN FLD QL: 4
WBC UR QL: SIGNIFICANT CHANGE UP /HPF (ref 0–5)

## 2022-12-28 PROCEDURE — 81025 URINE PREGNANCY TEST: CPT

## 2022-12-28 PROCEDURE — 87086 URINE CULTURE/COLONY COUNT: CPT

## 2022-12-28 PROCEDURE — 99283 EMERGENCY DEPT VISIT LOW MDM: CPT

## 2022-12-28 PROCEDURE — 81001 URINALYSIS AUTO W/SCOPE: CPT

## 2022-12-28 PROCEDURE — 99284 EMERGENCY DEPT VISIT MOD MDM: CPT

## 2022-12-28 RX ORDER — PHENAZOPYRIDINE HCL 100 MG
200 TABLET ORAL ONCE
Refills: 0 | Status: COMPLETED | OUTPATIENT
Start: 2022-12-28 | End: 2022-12-28

## 2022-12-28 RX ORDER — CEPHALEXIN 500 MG
500 CAPSULE ORAL ONCE
Refills: 0 | Status: COMPLETED | OUTPATIENT
Start: 2022-12-28 | End: 2022-12-28

## 2022-12-28 RX ORDER — CEPHALEXIN 500 MG
1 CAPSULE ORAL
Qty: 21 | Refills: 0
Start: 2022-12-28 | End: 2023-01-03

## 2022-12-28 RX ADMIN — Medication 500 MILLIGRAM(S): at 14:09

## 2022-12-28 RX ADMIN — Medication 200 MILLIGRAM(S): at 14:08

## 2022-12-28 NOTE — ED PROVIDER NOTE - PROGRESS NOTE DETAILS
POLO- urine consistent with uti, Pt reassessed, pt feeling better at this time, vss, pt able to walk, talk and vocalized plan of action. Discussed in depth and explained to pt in depth the next steps that need to be taking including proper follow up with PCP or specialists. All incidental findings were discussed with pt as well. Pt verbalized their concerns and all questions were answered. Pt understands dispo and wants discharge. Given good instructions when to return to ED and importance of f/u.

## 2022-12-28 NOTE — ED PROVIDER NOTE - CLINICAL SUMMARY MEDICAL DECISION MAKING FREE TEXT BOX
Pt with frequency, urgency and dysuria. Likely UTI. Will check UA, treat symptomatically and reassess.

## 2022-12-28 NOTE — ED PROVIDER NOTE - ATTENDING APP SHARED VISIT CONTRIBUTION OF CARE
I, Claudette Suazo, performed the initial face to face bedside interview with this patient regarding history of present illness, review of symptoms and relevant past medical, social and family history.  I completed an independent physical examination.  I was the initial provider who evaluated this patient. I have signed out the follow up of any pending tests (i.e. labs, radiological studies) to the ACP.  I have communicated the patient’s plan of care and disposition with the ACP.  The history, relevant review of systems, past medical and surgical history, medical decision making, and physical examination was documented by the scribe in my presence and I attest to the accuracy of the documentation.

## 2022-12-28 NOTE — ED PROVIDER NOTE - OBJECTIVE STATEMENT
26 y/o female with no pmhx c/o dysuria, frequency and urgency to urinate started last night. Denies back pain. Pt states Last BM was 3 days states normally goes every 2 days. Pt also c/o pelvic pressure pain. Currently on menstrual cycle.    Alfonzo

## 2022-12-28 NOTE — ED PROVIDER NOTE - PATIENT PORTAL LINK FT
You can access the FollowMyHealth Patient Portal offered by [facility] by registering at the following website: [email or web address]. By joining [employer]’s FollowMyHealth portal, you will also be able to view your health information using other applications (apps) compatible with our system.

## 2022-12-28 NOTE — ED PROVIDER NOTE - NS ED ATTENDING STATEMENT MOD
This was a shared visit with the NING. I reviewed and verified the documentation and independently performed the documented:

## 2022-12-30 LAB
CULTURE RESULTS: SIGNIFICANT CHANGE UP
SPECIMEN SOURCE: SIGNIFICANT CHANGE UP

## 2023-04-07 ENCOUNTER — EMERGENCY (EMERGENCY)
Facility: HOSPITAL | Age: 28
LOS: 1 days | Discharge: HOME | End: 2023-04-07
Attending: EMERGENCY MEDICINE
Payer: COMMERCIAL

## 2023-04-07 VITALS
HEART RATE: 83 BPM | HEIGHT: 68 IN | SYSTOLIC BLOOD PRESSURE: 121 MMHG | WEIGHT: 160.94 LBS | OXYGEN SATURATION: 100 % | DIASTOLIC BLOOD PRESSURE: 88 MMHG | RESPIRATION RATE: 18 BRPM | TEMPERATURE: 99 F

## 2023-04-07 LAB
ALBUMIN SERPL ELPH-MCNC: 4.2 G/DL — SIGNIFICANT CHANGE UP (ref 3.3–5.2)
ALP SERPL-CCNC: 40 U/L — SIGNIFICANT CHANGE UP (ref 40–120)
ALT FLD-CCNC: 76 U/L — HIGH
ANION GAP SERPL CALC-SCNC: 13 MMOL/L — SIGNIFICANT CHANGE UP (ref 5–17)
APPEARANCE UR: CLEAR — SIGNIFICANT CHANGE UP
AST SERPL-CCNC: 48 U/L — HIGH
BACTERIA # UR AUTO: ABNORMAL
BASOPHILS # BLD AUTO: 0.04 K/UL — SIGNIFICANT CHANGE UP (ref 0–0.2)
BASOPHILS NFR BLD AUTO: 0.4 % — SIGNIFICANT CHANGE UP (ref 0–2)
BILIRUB SERPL-MCNC: 0.5 MG/DL — SIGNIFICANT CHANGE UP (ref 0.4–2)
BILIRUB UR-MCNC: NEGATIVE — SIGNIFICANT CHANGE UP
BUN SERPL-MCNC: 10.7 MG/DL — SIGNIFICANT CHANGE UP (ref 8–20)
CALCIUM SERPL-MCNC: 9 MG/DL — SIGNIFICANT CHANGE UP (ref 8.4–10.5)
CHLORIDE SERPL-SCNC: 102 MMOL/L — SIGNIFICANT CHANGE UP (ref 96–108)
CO2 SERPL-SCNC: 20 MMOL/L — LOW (ref 22–29)
COLOR SPEC: YELLOW — SIGNIFICANT CHANGE UP
COMMENT - URINE: SIGNIFICANT CHANGE UP
CREAT SERPL-MCNC: 0.6 MG/DL — SIGNIFICANT CHANGE UP (ref 0.5–1.3)
DIFF PNL FLD: ABNORMAL
EGFR: 125 ML/MIN/1.73M2 — SIGNIFICANT CHANGE UP
EOSINOPHIL # BLD AUTO: 0.18 K/UL — SIGNIFICANT CHANGE UP (ref 0–0.5)
EOSINOPHIL NFR BLD AUTO: 1.8 % — SIGNIFICANT CHANGE UP (ref 0–6)
EPI CELLS # UR: SIGNIFICANT CHANGE UP
GLUCOSE SERPL-MCNC: 86 MG/DL — SIGNIFICANT CHANGE UP (ref 70–99)
GLUCOSE UR QL: NEGATIVE MG/DL — SIGNIFICANT CHANGE UP
HCT VFR BLD CALC: 32.4 % — LOW (ref 34.5–45)
HGB BLD-MCNC: 10.4 G/DL — LOW (ref 11.5–15.5)
IMM GRANULOCYTES NFR BLD AUTO: 0.3 % — SIGNIFICANT CHANGE UP (ref 0–0.9)
KETONES UR-MCNC: ABNORMAL
LEUKOCYTE ESTERASE UR-ACNC: ABNORMAL
LYMPHOCYTES # BLD AUTO: 2.48 K/UL — SIGNIFICANT CHANGE UP (ref 1–3.3)
LYMPHOCYTES # BLD AUTO: 25 % — SIGNIFICANT CHANGE UP (ref 13–44)
MCHC RBC-ENTMCNC: 22.7 PG — LOW (ref 27–34)
MCHC RBC-ENTMCNC: 32.1 GM/DL — SIGNIFICANT CHANGE UP (ref 32–36)
MCV RBC AUTO: 70.6 FL — LOW (ref 80–100)
MONOCYTES # BLD AUTO: 0.83 K/UL — SIGNIFICANT CHANGE UP (ref 0–0.9)
MONOCYTES NFR BLD AUTO: 8.4 % — SIGNIFICANT CHANGE UP (ref 2–14)
NEUTROPHILS # BLD AUTO: 6.35 K/UL — SIGNIFICANT CHANGE UP (ref 1.8–7.4)
NEUTROPHILS NFR BLD AUTO: 64.1 % — SIGNIFICANT CHANGE UP (ref 43–77)
NITRITE UR-MCNC: NEGATIVE — SIGNIFICANT CHANGE UP
PH UR: 6 — SIGNIFICANT CHANGE UP (ref 5–8)
PLATELET # BLD AUTO: 301 K/UL — SIGNIFICANT CHANGE UP (ref 150–400)
POTASSIUM SERPL-MCNC: 3.6 MMOL/L — SIGNIFICANT CHANGE UP (ref 3.5–5.3)
POTASSIUM SERPL-SCNC: 3.6 MMOL/L — SIGNIFICANT CHANGE UP (ref 3.5–5.3)
PROT SERPL-MCNC: 7.2 G/DL — SIGNIFICANT CHANGE UP (ref 6.6–8.7)
PROT UR-MCNC: 30 MG/DL
RBC # BLD: 4.59 M/UL — SIGNIFICANT CHANGE UP (ref 3.8–5.2)
RBC # FLD: 16 % — HIGH (ref 10.3–14.5)
RBC CASTS # UR COMP ASSIST: SIGNIFICANT CHANGE UP /HPF (ref 0–4)
SODIUM SERPL-SCNC: 135 MMOL/L — SIGNIFICANT CHANGE UP (ref 135–145)
SP GR SPEC: 1.01 — SIGNIFICANT CHANGE UP (ref 1.01–1.02)
UROBILINOGEN FLD QL: 1 MG/DL
WBC # BLD: 9.91 K/UL — SIGNIFICANT CHANGE UP (ref 3.8–10.5)
WBC # FLD AUTO: 9.91 K/UL — SIGNIFICANT CHANGE UP (ref 3.8–10.5)
WBC UR QL: SIGNIFICANT CHANGE UP /HPF (ref 0–5)

## 2023-04-07 PROCEDURE — 96360 HYDRATION IV INFUSION INIT: CPT

## 2023-04-07 PROCEDURE — 96361 HYDRATE IV INFUSION ADD-ON: CPT

## 2023-04-07 PROCEDURE — 36415 COLL VENOUS BLD VENIPUNCTURE: CPT

## 2023-04-07 PROCEDURE — 99283 EMERGENCY DEPT VISIT LOW MDM: CPT | Mod: 25

## 2023-04-07 PROCEDURE — 85025 COMPLETE CBC W/AUTO DIFF WBC: CPT

## 2023-04-07 PROCEDURE — T1013: CPT

## 2023-04-07 PROCEDURE — 80053 COMPREHEN METABOLIC PANEL: CPT

## 2023-04-07 PROCEDURE — 99284 EMERGENCY DEPT VISIT MOD MDM: CPT

## 2023-04-07 PROCEDURE — 81001 URINALYSIS AUTO W/SCOPE: CPT

## 2023-04-07 RX ORDER — SODIUM CHLORIDE 9 MG/ML
1000 INJECTION INTRAMUSCULAR; INTRAVENOUS; SUBCUTANEOUS ONCE
Refills: 0 | Status: COMPLETED | OUTPATIENT
Start: 2023-04-07 | End: 2023-04-07

## 2023-04-07 RX ORDER — MICONAZOLE NITRATE 2 %
1 CREAM (GRAM) TOPICAL
Qty: 1 | Refills: 0
Start: 2023-04-07 | End: 2023-04-11

## 2023-04-07 RX ORDER — METOCLOPRAMIDE HCL 10 MG
1 TABLET ORAL
Qty: 1 | Refills: 0
Start: 2023-04-07 | End: 2023-04-13

## 2023-04-07 RX ORDER — METOCLOPRAMIDE HCL 10 MG
1 TABLET ORAL
Qty: 1 | Refills: 0
Start: 2023-04-07 | End: 2023-04-20

## 2023-04-07 RX ORDER — METOCLOPRAMIDE HCL 10 MG
10 TABLET ORAL ONCE
Refills: 0 | Status: COMPLETED | OUTPATIENT
Start: 2023-04-07 | End: 2023-04-07

## 2023-04-07 RX ADMIN — SODIUM CHLORIDE 1000 MILLILITER(S): 9 INJECTION INTRAMUSCULAR; INTRAVENOUS; SUBCUTANEOUS at 07:52

## 2023-04-07 RX ADMIN — SODIUM CHLORIDE 1000 MILLILITER(S): 9 INJECTION INTRAMUSCULAR; INTRAVENOUS; SUBCUTANEOUS at 07:00

## 2023-04-07 RX ADMIN — Medication 10 MILLIGRAM(S): at 05:14

## 2023-04-07 NOTE — ED PROVIDER NOTE - PROGRESS NOTE DETAILS
LAURA Sexton: Patient feeling an improvement of symptoms. Discussed blood work and urine results with patient. Patient given 2L of fluids and discussed following-up with OB within 1-2 days for further evaluation, and when to return to ED if needed. Patient verbalized understanding of all instructions.

## 2023-04-07 NOTE — ED PROVIDER NOTE - NSFOLLOWUPINSTRUCTIONS_ED_ALL_ED_FT
Beto un seguimiento con enriquez obstetra dentro de 1 a 2 días para marky evaluación adicional.  Appleby los medicamentos según lo prescrito.  Appleby la dosis completa de Monistat para la candidiasis aunque se sienta mejor.    Náuseas vómitos    Las náuseas son la sensación de que tienes ganas de vomitar. A medida que las náuseas empeoran, pueden provocar vómitos. Los vómitos aumentan el riesgo de deshidratación. Los adultos mayores y las personas con otras enfermedades o un sistema inmunitario débil tienen un mayor riesgo de deshidratación. Vita líquidos jonn en cantidades pequeñas jazz frecuentes según lo tolere. Coma alimentos suaves y fáciles de digerir en pequeñas cantidades según lo tolere.    BUSQUE ATENCIÓN MÉDICA INMEDIATA SI TIENE ALGUNO DE LOS SIGUIENTES SÍNTOMAS: fiebre, incapacidad para retener suficientes líquidos, vómito germaine o con lluvia, heces negras o con lluvia, aturdimiento/mareos, dolor en el pecho, dolor de shanae intenso, sarpullido, dificultad para respirar, resfriado o piel húmeda, confusión, dolor al orinar o cualquier signo de deshidratación.    Infección micótica vaginal en mujeres adultas  Vaginal Yeast Infection, Adult  Female body with a close-up showing the vagina with a yeast infection.  La infección micótica vaginal es marky afección que causa secreción vaginal y también dolor, hinchazón y enrojecimiento (inflamación) de la vagina. Esta es marky afección frecuente. Algunas mujeres contraen esta infección con frecuencia.    ¿Cuáles son las causas?  La causa de la infección es un cambio en el equilibrio normal de las levaduras (cándida) y las bacterias normales que viven en la vagina. Esta alteración deriva en el crecimiento excesivo de las levaduras, lo que causa la inflamación.    ¿Qué incrementa el riesgo?  Es más probable que esta afección ocurra en las mujeres que:  Elizabeth antibióticos.  Tienen diabetes.  Elizabeth anticonceptivos orales.  Están embarazadas.  Se hacen duchas vaginales con frecuencia.  Tienen debilitado el sistema de defensa del organismo (sistema inmunitario).  Moy estado tomando medicamentos con corticoesteroides bertha mucho tiempo.  Usan ropa ajustada con frecuencia.  ¿Cuáles son los signos o síntomas?  Los síntomas de esta afección incluyen:  Secreción vaginal vladimir, cremosa y espesa.  Hinchazón, picazón, enrojecimiento e irritación de la vagina. Los labios de la vagina (labios de la vulva) también pueden infectarse.  Dolor o ardor al orinar.  Dolor bertha las relaciones sexuales.  ¿Cómo se diagnostica?  Esta afección se diagnostica en función de lo siguiente:  Mirna antecedentes médicos.  Un examen físico.  Un examen pélvico. El médico examinará marky muestra de la secreción vaginal con un microscopio. Probablemente el médico envíe esta muestra al laboratorio para analizarla y confirmar el diagnóstico.  ¿Cómo se trata?  Esta afección se trata con medicamentos. Los medicamentos pueden ser recetados o de venta demi. Podrán indicarle que use jose o más de lo siguiente:  Medicamentos que se elizabeth por boca (orales).  Medicamentos que se aplican bayron marky crema (tópicos).  Medicamentos que se colocan directamente en la vagina (óvulos vaginales).  Siga estas indicaciones en enriquez casa:  Use o aplíquese los medicamentos de venta demi y los recetados solamente bayron se lo haya indicado el médico.  No use tampones hasta que el médico la autorice.  No tenga sexo hasta que la infección haya desaparecido. El sexo puede prolongar o empeorar mirna síntomas de infección. Pregúntele al médico cuándo es seguro reanudar la actividad sexual.  Concurra a todas las visitas de seguimiento. [place] es importante.  ¿Cómo se emi?  A sign showing that a person should not douche.  No use ropa ajustada, bayron pantimedias o pantalones ajustados.  Use ropa interior de algodón, que permite el paso del aire.  No use duchas vaginales, jabón perfumado, cremas ni talcos.  Cuando vaya al baño, siempre higienícese de adelante hacia atrás.  Si tiene diabetes, mantenga bajo control los niveles de azúcar en la lluvia.  Pregúntele al médico otras maneras de prevenir las infecciones por levaduras.  Comuníquese con un médico si:  Tiene fiebre.  Los síntomas desaparecen y luego vuelven a aparecer.  Los síntomas no mejoran con el tratamiento.  Mirna síntomas empeoran.  Aparecen nuevos síntomas.  Aparecen ampollas alrededor o adentro de la vagina.  Le sale lluvia de la vagina y no está menstruando.  Siente dolor en el abdomen.  Resumen  La infección micótica vaginal es marky afección que causa secreción y también dolor, hinchazón y enrojecimiento (inflamación) de la vagina.  Esta afección se trata con medicamentos. Los medicamentos pueden ser recetados o de venta demi.  Use o aplíquese los medicamentos de venta demi y los recetados solamente bayron se lo haya indicado el médico.  No se beto duchas vaginales. Reanude la actividad sexual u use tampones bayron se lo haya indicado el médico.  Comuníquese con un médico si los síntomas no mejoran con el tratamiento o si los síntomas desaparecen y luego regresan.

## 2023-04-07 NOTE — ED PROVIDER NOTE - PHYSICAL EXAMINATION
General: non-toxic appearing, in no acute distress, A+Ox3  CV: RRR, nl s1/s2.  Resp: CTAB, normal rate and effort  GI: Abdomen soft, NT, ND. Active bowel sounds. No rebound, no guarding  : No CVAT.

## 2023-04-07 NOTE — ED PROVIDER NOTE - PATIENT PORTAL LINK FT
You can access the FollowMyHealth Patient Portal offered by [facility] by registering at the following website: [email or web address]. By joining [employer]’s FollowMyHealth portal, you will also be able to view your health information using other applications (apps) compatible with our system.

## 2023-04-07 NOTE — ED ADULT NURSE NOTE - SUICIDE SCREENING QUESTION 3
Render Risk Assessment In Note?: no Detail Level: Simple Comment: S/P MOHS with Revita graft for repair No

## 2023-04-07 NOTE — ED ADULT NURSE NOTE - OBJECTIVE STATEMENT
Pt. c/o vomiting x5 days with chills.  Pt. also c/o vaginal itching and burning.  Denies dysuria/hematuria.  Pt. states she's 7 wks pregnant.

## 2023-04-07 NOTE — ED PROVIDER NOTE - OBJECTIVE STATEMENT
26yo  6w gest presents to ED c/o vomiting x2d. pt reports 3 episodes today. pt also reports vaginal itching and thick white DC x1w. denies fever, abdominal pain, diarrhea, dysuria 28yo  6w gest presents to ED c/o N/V x2d. pt reports 3 episodes today. pt reports improvement of nausea since arrival to ED. Tolerating PO. pt reports hyperemesis in previous pregnancy. pt also reports vaginal itching and thick white DC x1w. denies fever, abdominal pain, diarrhea, sick contacts, dysuria. 28yo  6w gest presents to ED c/o N/V x2d. pt reports 3 episodes today. pt reports improvement of nausea since arrival to ED. Tolerating PO. pt reports hyperemesis in previous pregnancy. pt also reports vaginal itching and thick white DC x1w. pt has upcoming apt for first OB visit. denies fever, abdominal pain, diarrhea, sick contacts, dysuria.

## 2023-04-07 NOTE — ED PROVIDER NOTE - CLINICAL SUMMARY MEDICAL DECISION MAKING FREE TEXT BOX
26yo F p/w hyperemesis. pt reports improvement of nausea since arrival to ED. tolerating PO. upon PE, non-toxic appearing, abdomen soft NT ND, vitals stable. ordered reglan. pt reports improvement sx after meds given. sent meds to pharmacy. pt also c/o vaginal itching and DC.  sx consistent with dx of yeast infection. sent monistat to pharmacy. ordered UA to eval for ketones, awaiting results.     discussed supportive care measures and return precautions. advised to f/u with OB. pt verbalized understanding and agreement. 28yo F p/w hyperemesis. pt reports improvement of nausea since arrival to ED. tolerating PO. upon PE, non-toxic appearing, abdomen soft NT ND, vitals stable. ordered reglan. pt reports improvement sx after meds given. sent meds to pharmacy. pt also c/o vaginal itching and DC.  sx consistent with dx of yeast infection. sent Monistat to pharmacy. ordered UA- revealed ketones, protein, and yeast, neg nitrites, no leukocytes, occasion bacteria. reviewed results with pt. ordered IVF. discussed supportive care measures and return precautions. pt verbalized understanding and agreement    discussed supportive care measures and return precautions. advised to f/u with OB. pt verbalized understanding and agreement.

## 2023-04-07 NOTE — ED PROVIDER NOTE - ATTENDING APP SHARED VISIT CONTRIBUTION OF CARE
28 yo F  approx 6 weeks gravid c/o increased N/V and reports thick whitish vaginal d/c.  Pt has her first prenatal  appt at [facility] in 2 weeks.  No reported vaginal bleeding.  will check UA, Rx Reglan and start topical Rx for vaginitis

## 2023-04-07 NOTE — ED ADULT TRIAGE NOTE - CHIEF COMPLAINT QUOTE
Patient presents to ED with c/o vomiting x 5 days, patient is 7 weeks pregnant.  Patient also with c/o vaginal itching and burning.  Denies burning on urination.  (+) liquid yellow discharge  (+) chills  Fever a few days ago but not since.

## 2023-04-17 ENCOUNTER — EMERGENCY (EMERGENCY)
Facility: HOSPITAL | Age: 28
LOS: 1 days | Discharge: HOME | End: 2023-04-17
Attending: EMERGENCY MEDICINE
Payer: COMMERCIAL

## 2023-04-17 VITALS
TEMPERATURE: 98 F | RESPIRATION RATE: 18 BRPM | WEIGHT: 160.06 LBS | HEIGHT: 68 IN | DIASTOLIC BLOOD PRESSURE: 80 MMHG | OXYGEN SATURATION: 98 % | SYSTOLIC BLOOD PRESSURE: 119 MMHG | HEART RATE: 99 BPM

## 2023-04-17 LAB
ALBUMIN SERPL ELPH-MCNC: 3.7 G/DL — SIGNIFICANT CHANGE UP (ref 3.3–5.2)
ALBUMIN SERPL ELPH-MCNC: 4.3 G/DL — SIGNIFICANT CHANGE UP (ref 3.3–5.2)
ALP SERPL-CCNC: 43 U/L — SIGNIFICANT CHANGE UP (ref 40–120)
ALP SERPL-CCNC: 51 U/L — SIGNIFICANT CHANGE UP (ref 40–120)
ALT FLD-CCNC: 175 U/L — HIGH
ALT FLD-CCNC: 211 U/L — HIGH
ANION GAP SERPL CALC-SCNC: 15 MMOL/L — SIGNIFICANT CHANGE UP (ref 5–17)
ANION GAP SERPL CALC-SCNC: 19 MMOL/L — HIGH (ref 5–17)
ANISOCYTOSIS BLD QL: SLIGHT — SIGNIFICANT CHANGE UP
APPEARANCE UR: CLEAR — SIGNIFICANT CHANGE UP
AST SERPL-CCNC: 113 U/L — HIGH
AST SERPL-CCNC: 164 U/L — HIGH
BACTERIA # UR AUTO: ABNORMAL
BASOPHILS # BLD AUTO: 0.03 K/UL — SIGNIFICANT CHANGE UP (ref 0–0.2)
BASOPHILS NFR BLD AUTO: 0.3 % — SIGNIFICANT CHANGE UP (ref 0–2)
BILIRUB SERPL-MCNC: 0.5 MG/DL — SIGNIFICANT CHANGE UP (ref 0.4–2)
BILIRUB SERPL-MCNC: 0.6 MG/DL — SIGNIFICANT CHANGE UP (ref 0.4–2)
BILIRUB UR-MCNC: ABNORMAL
BLD GP AB SCN SERPL QL: SIGNIFICANT CHANGE UP
BUN SERPL-MCNC: 10.3 MG/DL — SIGNIFICANT CHANGE UP (ref 8–20)
BUN SERPL-MCNC: 10.9 MG/DL — SIGNIFICANT CHANGE UP (ref 8–20)
BURR CELLS BLD QL SMEAR: PRESENT — SIGNIFICANT CHANGE UP
CALCIUM SERPL-MCNC: 8.6 MG/DL — SIGNIFICANT CHANGE UP (ref 8.4–10.5)
CALCIUM SERPL-MCNC: 9.6 MG/DL — SIGNIFICANT CHANGE UP (ref 8.4–10.5)
CHLORIDE SERPL-SCNC: 105 MMOL/L — SIGNIFICANT CHANGE UP (ref 96–108)
CHLORIDE SERPL-SCNC: 99 MMOL/L — SIGNIFICANT CHANGE UP (ref 96–108)
CO2 SERPL-SCNC: 17 MMOL/L — LOW (ref 22–29)
CO2 SERPL-SCNC: 17 MMOL/L — LOW (ref 22–29)
COLOR SPEC: ABNORMAL
CREAT SERPL-MCNC: 0.47 MG/DL — LOW (ref 0.5–1.3)
CREAT SERPL-MCNC: 0.51 MG/DL — SIGNIFICANT CHANGE UP (ref 0.5–1.3)
DACRYOCYTES BLD QL SMEAR: SLIGHT — SIGNIFICANT CHANGE UP
DIFF PNL FLD: NEGATIVE — SIGNIFICANT CHANGE UP
EGFR: 130 ML/MIN/1.73M2 — SIGNIFICANT CHANGE UP
EGFR: 132 ML/MIN/1.73M2 — SIGNIFICANT CHANGE UP
EOSINOPHIL # BLD AUTO: 0.12 K/UL — SIGNIFICANT CHANGE UP (ref 0–0.5)
EOSINOPHIL NFR BLD AUTO: 1.3 % — SIGNIFICANT CHANGE UP (ref 0–6)
EPI CELLS # UR: SIGNIFICANT CHANGE UP
GLUCOSE SERPL-MCNC: 81 MG/DL — SIGNIFICANT CHANGE UP (ref 70–99)
GLUCOSE SERPL-MCNC: 82 MG/DL — SIGNIFICANT CHANGE UP (ref 70–99)
GLUCOSE UR QL: NEGATIVE — SIGNIFICANT CHANGE UP
HCG SERPL-ACNC: HIGH MIU/ML
HCT VFR BLD CALC: 36.6 % — SIGNIFICANT CHANGE UP (ref 34.5–45)
HGB BLD-MCNC: 11.9 G/DL — SIGNIFICANT CHANGE UP (ref 11.5–15.5)
IMM GRANULOCYTES NFR BLD AUTO: 0.3 % — SIGNIFICANT CHANGE UP (ref 0–0.9)
KETONES UR-MCNC: ABNORMAL
LEUKOCYTE ESTERASE UR-ACNC: ABNORMAL
LYMPHOCYTES # BLD AUTO: 2.04 K/UL — SIGNIFICANT CHANGE UP (ref 1–3.3)
LYMPHOCYTES # BLD AUTO: 22.5 % — SIGNIFICANT CHANGE UP (ref 13–44)
MANUAL SMEAR VERIFICATION: SIGNIFICANT CHANGE UP
MCHC RBC-ENTMCNC: 23 PG — LOW (ref 27–34)
MCHC RBC-ENTMCNC: 32.5 GM/DL — SIGNIFICANT CHANGE UP (ref 32–36)
MCV RBC AUTO: 70.7 FL — LOW (ref 80–100)
MONOCYTES # BLD AUTO: 0.92 K/UL — HIGH (ref 0–0.9)
MONOCYTES NFR BLD AUTO: 10.1 % — SIGNIFICANT CHANGE UP (ref 2–14)
NEUTROPHILS # BLD AUTO: 5.94 K/UL — SIGNIFICANT CHANGE UP (ref 1.8–7.4)
NEUTROPHILS NFR BLD AUTO: 65.5 % — SIGNIFICANT CHANGE UP (ref 43–77)
NITRITE UR-MCNC: POSITIVE
PH UR: 5 — SIGNIFICANT CHANGE UP (ref 5–8)
PLAT MORPH BLD: NORMAL — SIGNIFICANT CHANGE UP
PLATELET # BLD AUTO: 293 K/UL — SIGNIFICANT CHANGE UP (ref 150–400)
POIKILOCYTOSIS BLD QL AUTO: SLIGHT — SIGNIFICANT CHANGE UP
POLYCHROMASIA BLD QL SMEAR: SIGNIFICANT CHANGE UP
POTASSIUM SERPL-MCNC: 4 MMOL/L — SIGNIFICANT CHANGE UP (ref 3.5–5.3)
POTASSIUM SERPL-MCNC: 4.5 MMOL/L — SIGNIFICANT CHANGE UP (ref 3.5–5.3)
POTASSIUM SERPL-SCNC: 4 MMOL/L — SIGNIFICANT CHANGE UP (ref 3.5–5.3)
POTASSIUM SERPL-SCNC: 4.5 MMOL/L — SIGNIFICANT CHANGE UP (ref 3.5–5.3)
PROT SERPL-MCNC: 6.8 G/DL — SIGNIFICANT CHANGE UP (ref 6.6–8.7)
PROT SERPL-MCNC: 8.2 G/DL — SIGNIFICANT CHANGE UP (ref 6.6–8.7)
PROT UR-MCNC: 30 MG/DL
RBC # BLD: 5.18 M/UL — SIGNIFICANT CHANGE UP (ref 3.8–5.2)
RBC # FLD: 17.1 % — HIGH (ref 10.3–14.5)
RBC BLD AUTO: ABNORMAL
RBC CASTS # UR COMP ASSIST: SIGNIFICANT CHANGE UP /HPF (ref 0–4)
SODIUM SERPL-SCNC: 135 MMOL/L — SIGNIFICANT CHANGE UP (ref 135–145)
SODIUM SERPL-SCNC: 137 MMOL/L — SIGNIFICANT CHANGE UP (ref 135–145)
SP GR SPEC: 1.02 — SIGNIFICANT CHANGE UP (ref 1.01–1.02)
TARGETS BLD QL SMEAR: SLIGHT — SIGNIFICANT CHANGE UP
UROBILINOGEN FLD QL: 4
WBC # BLD: 9.08 K/UL — SIGNIFICANT CHANGE UP (ref 3.8–10.5)
WBC # FLD AUTO: 9.08 K/UL — SIGNIFICANT CHANGE UP (ref 3.8–10.5)
WBC UR QL: SIGNIFICANT CHANGE UP /HPF (ref 0–5)

## 2023-04-17 PROCEDURE — 87086 URINE CULTURE/COLONY COUNT: CPT

## 2023-04-17 PROCEDURE — 76817 TRANSVAGINAL US OBSTETRIC: CPT | Mod: 26

## 2023-04-17 PROCEDURE — T1013: CPT

## 2023-04-17 PROCEDURE — 86901 BLOOD TYPING SEROLOGIC RH(D): CPT

## 2023-04-17 PROCEDURE — 84702 CHORIONIC GONADOTROPIN TEST: CPT

## 2023-04-17 PROCEDURE — 86900 BLOOD TYPING SEROLOGIC ABO: CPT

## 2023-04-17 PROCEDURE — 96374 THER/PROPH/DIAG INJ IV PUSH: CPT

## 2023-04-17 PROCEDURE — 96376 TX/PRO/DX INJ SAME DRUG ADON: CPT

## 2023-04-17 PROCEDURE — 76817 TRANSVAGINAL US OBSTETRIC: CPT

## 2023-04-17 PROCEDURE — 86850 RBC ANTIBODY SCREEN: CPT

## 2023-04-17 PROCEDURE — 85025 COMPLETE CBC W/AUTO DIFF WBC: CPT

## 2023-04-17 PROCEDURE — 99285 EMERGENCY DEPT VISIT HI MDM: CPT

## 2023-04-17 PROCEDURE — 81001 URINALYSIS AUTO W/SCOPE: CPT

## 2023-04-17 PROCEDURE — 99284 EMERGENCY DEPT VISIT MOD MDM: CPT | Mod: 25

## 2023-04-17 PROCEDURE — 80074 ACUTE HEPATITIS PANEL: CPT

## 2023-04-17 PROCEDURE — 80053 COMPREHEN METABOLIC PANEL: CPT

## 2023-04-17 PROCEDURE — 96361 HYDRATE IV INFUSION ADD-ON: CPT

## 2023-04-17 PROCEDURE — 36415 COLL VENOUS BLD VENIPUNCTURE: CPT

## 2023-04-17 RX ORDER — SODIUM CHLORIDE 9 MG/ML
1000 INJECTION INTRAMUSCULAR; INTRAVENOUS; SUBCUTANEOUS ONCE
Refills: 0 | Status: COMPLETED | OUTPATIENT
Start: 2023-04-17 | End: 2023-04-17

## 2023-04-17 RX ORDER — ONDANSETRON 8 MG/1
4 TABLET, FILM COATED ORAL ONCE
Refills: 0 | Status: COMPLETED | OUTPATIENT
Start: 2023-04-17 | End: 2023-04-17

## 2023-04-17 RX ORDER — DOXYLAMINE SUCCINATE AND PYRIDOXINE HYDROCHLORIDE, DELAYED RELEASE TABLETS 10 MG/10 MG 10; 10 MG/1; MG/1
2 TABLET, DELAYED RELEASE ORAL
Qty: 20 | Refills: 0
Start: 2023-04-17 | End: 2023-04-26

## 2023-04-17 RX ORDER — CEPHALEXIN 500 MG
1 CAPSULE ORAL
Qty: 20 | Refills: 0
Start: 2023-04-17 | End: 2023-04-21

## 2023-04-17 RX ORDER — CEPHALEXIN 500 MG
500 CAPSULE ORAL ONCE
Refills: 0 | Status: COMPLETED | OUTPATIENT
Start: 2023-04-17 | End: 2023-04-17

## 2023-04-17 RX ADMIN — SODIUM CHLORIDE 1000 MILLILITER(S): 9 INJECTION INTRAMUSCULAR; INTRAVENOUS; SUBCUTANEOUS at 17:04

## 2023-04-17 RX ADMIN — ONDANSETRON 4 MILLIGRAM(S): 8 TABLET, FILM COATED ORAL at 17:49

## 2023-04-17 RX ADMIN — SODIUM CHLORIDE 1000 MILLILITER(S): 9 INJECTION INTRAMUSCULAR; INTRAVENOUS; SUBCUTANEOUS at 14:57

## 2023-04-17 RX ADMIN — ONDANSETRON 4 MILLIGRAM(S): 8 TABLET, FILM COATED ORAL at 14:57

## 2023-04-17 RX ADMIN — SODIUM CHLORIDE 1000 MILLILITER(S): 9 INJECTION INTRAMUSCULAR; INTRAVENOUS; SUBCUTANEOUS at 17:49

## 2023-04-17 RX ADMIN — Medication 500 MILLIGRAM(S): at 17:10

## 2023-04-17 NOTE — ED ADULT TRIAGE NOTE - CHIEF COMPLAINT QUOTE
pt is 8 weeks pregnant, c/o nausea & vomiting, was here a week ago, was told to come back if not better  A&Ox3, resp wnl,

## 2023-04-17 NOTE — ED STATDOCS - INTERPRETER'S NAME
Previously requiring BiPAP during daytime now progressively weaned down to room air at rest -> still occasionally requiring BiPAP use at night  In the setting of severe asthma exacerbation and possible tracheobronchomalacia (see above)    Influenza/RSV/COVID screen negative concha

## 2023-04-17 NOTE — ED STATDOCS - PATIENT PORTAL LINK FT
You can access the FollowMyHealth Patient Portal offered by [facility] by registering at the following website: [email or web address]. By joining [employer]’s FollowMyHealth portal, you will also be able to view your health information using other applications (apps) compatible with our system.

## 2023-04-17 NOTE — ED ADULT NURSE NOTE - OBJECTIVE STATEMENT
27 yof presents to ed estimated  9 weeks pregnant w/ history of hyperemesis gravidarum, here with uncontrolled nausea and vomiting unable to tolerate po. x 1 week

## 2023-04-17 NOTE — ED STATDOCS - CLINICAL SUMMARY MEDICAL DECISION MAKING FREE TEXT BOX
27-year-old female G2, P1 presents with vomiting for 1 month notes that is pregnant last menstrual period 2/15/2023.  States that today cannot keep anything down.  Denies any other symptoms Notes some lower abdominal pulling with vomiting but otherwise denies any pain or vaginal bleeding. Denies f/c//cp/sob/palpitations/ cough/rash/headache/dizziness/abd.pain/d/c/dysuria/hematuria. no sick contacts no recent travel. has not seen gyn for this pregnancy yet, natural pregnancy no IVF. has an appoitment with OB tomorrow    +iup on bedside sono  gestation age 9 weeks- labs fluids zofran reassess

## 2023-04-17 NOTE — ED STATDOCS - ATTENDING APP SHARED VISIT CONTRIBUTION OF CARE
I, Trish Mosqueda, performed the initial face to face bedside interview with this patient regarding history of present illness, review of symptoms and relevant past medical, social and family history.  I completed an independent physical examination.  I was the initial provider who evaluated this patient. I have signed out the follow up of any pending tests (i.e. labs, radiological studies) to the ACP.  I have communicated the patient’s plan of care and disposition with the ACP.

## 2023-04-17 NOTE — ED STATDOCS - OBJECTIVE STATEMENT
27-year-old female G2, P1 presents with vomiting for 1 month notes that is pregnant last menstrual period 2/15/2023.  States that today cannot keep anything down.  Denies any other symptoms Notes some lower abdominal pulling with vomiting but otherwise denies any pain or vaginal bleeding. Denies f/c//cp/sob/palpitations/ cough/rash/headache/dizziness/abd.pain/d/c/dysuria/hematuria. no sick contacts no recent travel. has not seen gyn for this pregnancy yet, natural pregnancy no IVF. has an appoitment with OB tomorrow

## 2023-04-18 LAB
CULTURE RESULTS: SIGNIFICANT CHANGE UP
HAV IGM SER-ACNC: SIGNIFICANT CHANGE UP
HBV CORE IGM SER-ACNC: SIGNIFICANT CHANGE UP
HBV SURFACE AG SER-ACNC: SIGNIFICANT CHANGE UP
HCV AB S/CO SERPL IA: 0.1 S/CO — SIGNIFICANT CHANGE UP (ref 0–0.99)
HCV AB SERPL-IMP: SIGNIFICANT CHANGE UP
SPECIMEN SOURCE: SIGNIFICANT CHANGE UP

## 2023-04-29 ENCOUNTER — EMERGENCY (EMERGENCY)
Facility: HOSPITAL | Age: 28
LOS: 1 days | Discharge: HOME | End: 2023-04-29
Attending: STUDENT IN AN ORGANIZED HEALTH CARE EDUCATION/TRAINING PROGRAM
Payer: COMMERCIAL

## 2023-04-29 VITALS
RESPIRATION RATE: 18 BRPM | OXYGEN SATURATION: 99 % | WEIGHT: 160.94 LBS | DIASTOLIC BLOOD PRESSURE: 82 MMHG | SYSTOLIC BLOOD PRESSURE: 117 MMHG | HEART RATE: 105 BPM | TEMPERATURE: 98 F | HEIGHT: 68 IN

## 2023-04-29 LAB
APPEARANCE UR: CLEAR — SIGNIFICANT CHANGE UP
BACTERIA # UR AUTO: ABNORMAL
BASOPHILS # BLD AUTO: 0.09 K/UL — SIGNIFICANT CHANGE UP (ref 0–0.2)
BASOPHILS NFR BLD AUTO: 0.9 % — SIGNIFICANT CHANGE UP (ref 0–2)
BILIRUB UR-MCNC: NEGATIVE — SIGNIFICANT CHANGE UP
BLD GP AB SCN SERPL QL: SIGNIFICANT CHANGE UP
COLOR SPEC: YELLOW — SIGNIFICANT CHANGE UP
DIFF PNL FLD: ABNORMAL
EOSINOPHIL # BLD AUTO: 0.09 K/UL — SIGNIFICANT CHANGE UP (ref 0–0.5)
EOSINOPHIL NFR BLD AUTO: 0.9 % — SIGNIFICANT CHANGE UP (ref 0–6)
EPI CELLS # UR: SIGNIFICANT CHANGE UP
GIANT PLATELETS BLD QL SMEAR: PRESENT — SIGNIFICANT CHANGE UP
GLUCOSE UR QL: NEGATIVE MG/DL — SIGNIFICANT CHANGE UP
HCT VFR BLD CALC: 36.6 % — SIGNIFICANT CHANGE UP (ref 34.5–45)
HGB BLD-MCNC: 11.9 G/DL — SIGNIFICANT CHANGE UP (ref 11.5–15.5)
KETONES UR-MCNC: ABNORMAL
LEUKOCYTE ESTERASE UR-ACNC: NEGATIVE — SIGNIFICANT CHANGE UP
LYMPHOCYTES # BLD AUTO: 2.68 K/UL — SIGNIFICANT CHANGE UP (ref 1–3.3)
LYMPHOCYTES # BLD AUTO: 26.5 % — SIGNIFICANT CHANGE UP (ref 13–44)
MANUAL SMEAR VERIFICATION: SIGNIFICANT CHANGE UP
MCHC RBC-ENTMCNC: 23.1 PG — LOW (ref 27–34)
MCHC RBC-ENTMCNC: 32.5 GM/DL — SIGNIFICANT CHANGE UP (ref 32–36)
MCV RBC AUTO: 71.1 FL — LOW (ref 80–100)
MONOCYTES # BLD AUTO: 0.63 K/UL — SIGNIFICANT CHANGE UP (ref 0–0.9)
MONOCYTES NFR BLD AUTO: 6.2 % — SIGNIFICANT CHANGE UP (ref 2–14)
NEUTROPHILS # BLD AUTO: 6.62 K/UL — SIGNIFICANT CHANGE UP (ref 1.8–7.4)
NEUTROPHILS NFR BLD AUTO: 65.5 % — SIGNIFICANT CHANGE UP (ref 43–77)
NITRITE UR-MCNC: NEGATIVE — SIGNIFICANT CHANGE UP
PH UR: 5 — SIGNIFICANT CHANGE UP (ref 5–8)
PLAT MORPH BLD: NORMAL — SIGNIFICANT CHANGE UP
PLATELET # BLD AUTO: 263 K/UL — SIGNIFICANT CHANGE UP (ref 150–400)
PROT UR-MCNC: 15
RBC # BLD: 5.15 M/UL — SIGNIFICANT CHANGE UP (ref 3.8–5.2)
RBC # FLD: 18.7 % — HIGH (ref 10.3–14.5)
RBC BLD AUTO: NORMAL — SIGNIFICANT CHANGE UP
RBC CASTS # UR COMP ASSIST: ABNORMAL /HPF (ref 0–4)
SP GR SPEC: 1.02 — SIGNIFICANT CHANGE UP (ref 1.01–1.02)
UROBILINOGEN FLD QL: 1 MG/DL
WBC # BLD: 10.11 K/UL — SIGNIFICANT CHANGE UP (ref 3.8–10.5)
WBC # FLD AUTO: 10.11 K/UL — SIGNIFICANT CHANGE UP (ref 3.8–10.5)
WBC UR QL: SIGNIFICANT CHANGE UP /HPF (ref 0–5)

## 2023-04-29 PROCEDURE — 99284 EMERGENCY DEPT VISIT MOD MDM: CPT

## 2023-04-29 NOTE — ED PROVIDER NOTE - OBJECTIVE STATEMENT
28 yo female  hx of ovarian tumor removal many years ago following with Dr Mary Muhammad presenting to the ED with vaginal bleeding that started around 5pm this evening with associated with abdominal cramping and lower back pain. no clots, no assocaited lightheadedness dizziness fever chills dysuria. reports that she has gone through only one pad only.   last sono

## 2023-04-29 NOTE — ED ADULT NURSE NOTE - PERIPHERAL VASCULAR ED EDEMA
;      [facility] Emergency Department  [address], [place] [address]  [phone]     Clinical Summary     PERSON INFORMATION   Name RAY DIAZ Age  16 Years  2003 12:00 AM   Acct# [id number] Sex Male Phone [phone]   Dispo Type Home - (i.e. Home on oxygen, DME)-  Arrival 2020 12:17 PM Checkout 2020 1:53 PM    Address: [address] [place] [id number]      Visit Reason NAUSEA HA     ED Physician Note     Patient:   RAY DIAZ            MRN: [id number]            FIN: [id number]               Age:   16 years     Sex:  Male     :  2003   Associated Diagnoses:   None   Author:   Cecilia Ford      Basic Information   History source: Patient.   Arrival mode: Walking.      History of Present Illness   The patient presents with headache.  The onset was 10:30am.  The course/duration of symptoms is constant.  Location: frontal. Radiating pain: none. The character of symptoms is tightness and pressure.  The degree at onset was minimal.  The degree at maximum was moderate.  The degree at present is moderate.  There are exacerbating factors including light and noise.  There are relieving factors including light avoidance and noise avoidance.  Risk factors consist of none.  Prior episodes: none.  Therapy today: tylenol at 10:30.  Preceding symptoms: none.  Associated symptoms: nausea, vomiting, denies dizziness, denies altered vision, denies photophobia, denies fever, denies chills, denies neck pain, denies syncope, denies rash, denies altered speech, denies altered level of consciousness and denies seizure.        Review of Systems   Constitutional symptoms:  No fever,    Skin symptoms:  No rash,    Eye symptoms:  No recent vision problems,    ENMT symptoms:  No sore throat,    Respiratory symptoms:  No shortness of breath,    Cardiovascular symptoms:  No chest pain,    Gastrointestinal symptoms:  Nausea, vomiting, No abdominal pain,     Genitourinary symptoms:  No dysuria,    Musculoskeletal symptoms:  No Joint pain,    Neurologic symptoms:  Headache.   Psychiatric symptoms:  Negative except as documented in HPI.   Endocrine symptoms:  Negative except as documented in HPI.   Hematologic/Lymphatic symptoms:  Negative except as documented in HPI.   Allergy/immunologic symptoms:  Negative except as documented in HPI.      Health Status   Allergies: No known allergies.   Immunizations: Up to date.      Past Medical/ Family/ Social History      Medical history   Respiratory: no asthma.   Neurological: migraine.   Surgical history: Appendectomy, tonsillectomy.   Social history: Alcohol use: Denies, Tobacco use: Denies, Drug use: Denies.      Physical Examination               Vital Signs   Vital Signs   9/18/2020 13:05          Peripheral Pulse Rate     65 bpm                             Respiratory Rate          16 br/min                             Systolic Blood Pressure   126 mmHg                             Diastolic Blood Pressure  77 mmHg                             Mean Arterial Pressure    93 mmHg                             Oxygen Saturation         100 %    9/18/2020 12:18          Temperature Tympanic      97.9 F                             Peripheral Pulse Rate     59 bpm  LOW                             Respiratory Rate          16 br/min                             Systolic Blood Pressure   125 mmHg                             Diastolic Blood Pressure  81 mmHg                             Mean Arterial Pressure    96 mmHg                             Oxygen Saturation         99 %  .               Per nurse's notes.   Vital Signs were reviewed.   Pulse Ox > 95% on Room Air which is normal for this patient.   General:  Alert, no acute distress.    Skin:  Warm, dry, pink, intact, no rash.    Head:  Normocephalic, atraumatic.    Neck:  Supple, trachea midline.    Ears, nose, mouth and throat:  Oral mucosa moist.   Cardiovascular:  Regular  rate and rhythm, S1, S2.    Respiratory:  Lungs are clear to auscultation, respirations are non-labored, Symmetrical chest wall expansion.    Chest wall:  No deformity.   Musculoskeletal:  Normal ROM, no tenderness, no swelling.    Neurological:  CN II-XII intact, Level of consciousness: Appropriate for age, Motor strength: Motor Exam reveals normal 5/5 strength to all four extremities with flexion and extension, Sensory: Sensation to all four extremities is grossly intact, Coordination: Finger to nose testing is normal bilaterally, Speech: Normal, Gait: Normal.       Medical Decision Making   Differential Diagnosis:  Migraine, tension headache, vomiting, dehydration, viral syndrome.    Rationale:  Patient is an 16-year-old male who presents the emergency room with chief complaint of a headache and vomiting.  Patient states he usually gets headaches but they classically will get better with Tylenol or ibuprofen.  Patient states he tried to take Tylenol but then proceeded to vomit.  Patient then decided come to the ER.  He is denying any changes in vision or speech.  States that the headache is frontal denies any recent head injuries.  The patient was nontoxic, afebrile and his vitals were stable.  Please see detailed physical exam as above.  Basic labs were obtained which showed no leukocytosis or anemia, normal electrolytes hepatic enzymes and lipase.  In the ER the patient was given IV fluids Reglan Benadryl and Zofran when reevaluated he states that his headache had completely resolved and he was resting comfortably in the bed.  At this time I do not feel a CT scan of the head is required since the patient improved with medications and laboratory studies appear to be normal I feel comfortable with discharge home.  Although multiple diagnoses were considered the patient was diagnosed with a migraine he was given a prescription for Fioricet and Zofran he understands he needs to follow-up with his primary care  doctor in 1 to 2 days and he was also given information for matt Vieyra.  Patient was instructed to otherwise immediately return to the ER if his symptoms worsen or if he develops any new symptoms such as changes in vision or speech.  Proper return precautions were discussed with this patient.   Results review:  Lab results : Lab View   9/18/2020 12:59          Glucose Lvl               116 mg/dL  HI                             BUN                       14 mg/dL                             Creatinine                0.91 mg/dL                             eGFR AfrAmer              >60 mL/min/1.73m2  NA                             eGFR NonAfrAmer           >60 mL/min/1.73m2  NA                             Sodium                    139 mEq/L                             Potassium                 4.0 mEq/L                             Chloride                  104 mEq/L                             TCO2                      26 mEq/L                             AGAP                      13 mEq/L                             Calcium                   9.5 mg/dL                             Alk Phos                  73 unit/L                             Bili Total                0.5 mg/dL                             Total Protein             8.1 g/dL                             Albumin                   4.8 g/dL                             Globulin_                 3.3 g/dL                             A/G Ratio_                1.5  NA                             AST/GOT                   14 unit/L                             ALT/GPT                   9 unit/L                             Lipase Level              13 unit/L                             WBC                       9.5 K/cumm                             RBC                       5.12 M/cumm                             Hgb                       15.8 g/dL                             Hct                       46 %                             MCV                       91  FL                             MCH                       31 pg                             MCHC                      34 g/dL                             RDW                       12.2 %                             Platelet                  169 K/cumm                             NRBC                      0.0 %                             Abs Neutro                7.8 K/cumm                             Neutrophil                82 %  NA                             Abs Lymph                 1.1 K/cumm                             Lymphocyte                12 %  NA                             Abs Mono                  0.5 K/cumm                             Monocyte                  5 %  NA                             Immature Gran             0.2 %                             Eosinophil                1 %                             Basophil                  0 %  .      Reexamination/ Reevaluation   Laboratory studies discussed with this patient  Patient was given IV fluids Reglan Benadryl and Zofran after receiving medications headache improved resting comfortably in the bed, ready for discharge      Impression and Plan   Diagnosis   Migraine headache   Plan   Condition: Stable.    Disposition: Discharged: to home.    Prescriptions: Launch prescriptions   Pharmacy:  ondansetron 4 mg oral tablet, disintegrating (Prescribe): 4 mg, 1 tab(s), PO, TID, 10 tab(s), 0 Refill(s), Launch prescriptions   Pharmacy:  Fioricet 325 mg-50 mg-40 mg oral tablet (Prescribe): 1 tab(s), PO, TID, for 3 day, PRN: for headache, 9 tab, 0 Refill(s).    Patient was given the following educational materials: Migraine Headache.    Follow up with: Follow up with primary care provider Within 1 to 2 days; [facility] Call for follow up appointment  Follow-Up As Directed  Return if symptoms worsen.    Counseled: Patient, Regarding diagnosis, Regarding diagnostic results, Regarding treatment plan, Regarding prescription, Patient  indicated understanding of instructions.        ED Time Seen By Provider Entered On:  9/18/2020 12:43     Performed On:  9/18/2020 12:43  by Cecilia Ford               Time Seen By Provider   Time Seen by Provider :   9/18/2020 12:43    Cecilia Ford - 9/18/2020 12:43           VITALS INFORMATION  Vitals/Ht/Wt  Temperature Tympanic:  97.9 F  Peripheral Pulse Rate:  59 bpm  Respiratory Rate:  16 br/min  Oxygen Saturation:  99 %  Systolic Blood Pressure:  125 mmHg  Diastolic Blood Pressure:  81 mmHg  Mean Arterial Pressure:  96 mmHg  Height:  175 cm  Weight:  63.25 kg  Weight Type:  Measured       MEDICAL INFORMATION   Allergy Info:          NKA             Prescriptions:                  Prescription Display   acetaminophen/butalbital/caffeine (Fioricet 325 mg-50 mg-40 mg oral tablet) 1 tab(s), PO, TID, PRN for headache, x 3 day, # 9 tab, 0 Refill(s), Tab   ondansetron (ondansetron 4 mg oral tablet, disintegrating) 4 mg = 1 tab(s), PO, DIS Tablet, TID, # 10 tab(s), 0 Refill(s)          DISCHARGE INFORMATION   Discharge Disposition: Home - (i.e. Home on oxygen, DME)- 01     PATIENT EDUCATION INFORMATION   Instructions:       Migraine Headache   Follow up:                  With: Address: When:   [address]  [address] [phone] [facility] (1)    Comments:   Call for follow up appointment   Follow-Up As Directed   Return if symptoms worsen       With: Address: When:   Follow up with primary care provider  Within 1 to 2 days            DIAGNOSIS           no

## 2023-04-29 NOTE — ED PROVIDER NOTE - PATIENT PORTAL LINK FT
You can access the FollowMyHealth Patient Portal offered by [facility] by registering at the following website: [email or web address]. By joining [employer]’s FollowMyHealth portal, you will also be able to view your health information using other applications (apps) compatible with our system.

## 2023-04-29 NOTE — ED PROVIDER NOTE - NSFOLLOWUPINSTRUCTIONS_ED_ALL_ED_FT
Currently stable without symptoms on medication.   please follow with OBGYN closely   please monitor for new or worsening bleeding   please return to the ED for new or worsening symptoms

## 2023-04-29 NOTE — ED PROVIDER NOTE - PROGRESS NOTE DETAILS
pt made aware of resuts and all incidental findings. given copy of reports. instructed on follow up and strict return precautions. pt verbalizes understanding and given opportunity to ask further questions       advised on fu with OBGYN and return precautions   verbalizes understanding

## 2023-04-29 NOTE — ED PROVIDER NOTE - PHYSICAL EXAMINATION
Gen: Well appearing in NAD  Head: NC/AT  Neck: trachea midline  Resp:  No distress  abd soft + suprapubic tenderness no rebound or guarding   Ext: no deformities  Neuro:  A&O appears non focal  Skin:  Warm and dry as visualized  Psych:  Normal affect and mood

## 2023-04-29 NOTE — ED PROVIDER NOTE - CLINICAL SUMMARY MEDICAL DECISION MAKING FREE TEXT BOX
female  lmp  presenting to the ED with v-bleeding since 5 pm with cramping. mild suprapubic tenderness on exam, will check labs sono and re-elier

## 2023-04-29 NOTE — ED ADULT NURSE NOTE - OBJECTIVE STATEMENT
Pt presents to ED c/o vaginal bleeding beginning today. Pt states bleeding is accompanied by cramping abdominal pain and lower back pain. Pt reports saturating one pad with blood. Currently 12 weeks pregnant. PMH ovarian tumor removal. Denies dizziness, chest pain, or SOB. Pt awaiting ultrasound at this time.

## 2023-04-29 NOTE — ED ADULT NURSE NOTE - NS ED NURSE RECORD ANOTHER VITAL SIGN
Apply ice for 20 minutes several times per day for the next 24-48 hours, then as needed for comfort. Narcotic pain medication may cause nausea or constipation. Take medication with food. Increase fluids and fiber intake. No creams, lotions, powders  or ointments to incision site. do not take any meds with tylenol until 10 pm. Yes

## 2023-04-30 LAB
ALBUMIN SERPL ELPH-MCNC: 4.3 G/DL — SIGNIFICANT CHANGE UP (ref 3.3–5.2)
ALP SERPL-CCNC: 58 U/L — SIGNIFICANT CHANGE UP (ref 40–120)
ALT FLD-CCNC: 122 U/L — HIGH
ANION GAP SERPL CALC-SCNC: 17 MMOL/L — SIGNIFICANT CHANGE UP (ref 5–17)
AST SERPL-CCNC: 51 U/L — HIGH
BILIRUB SERPL-MCNC: 0.2 MG/DL — LOW (ref 0.4–2)
BUN SERPL-MCNC: 10 MG/DL — SIGNIFICANT CHANGE UP (ref 8–20)
CALCIUM SERPL-MCNC: 9.6 MG/DL — SIGNIFICANT CHANGE UP (ref 8.4–10.5)
CHLORIDE SERPL-SCNC: 99 MMOL/L — SIGNIFICANT CHANGE UP (ref 96–108)
CO2 SERPL-SCNC: 19 MMOL/L — LOW (ref 22–29)
CREAT SERPL-MCNC: 0.58 MG/DL — SIGNIFICANT CHANGE UP (ref 0.5–1.3)
EGFR: 126 ML/MIN/1.73M2 — SIGNIFICANT CHANGE UP
GLUCOSE SERPL-MCNC: 86 MG/DL — SIGNIFICANT CHANGE UP (ref 70–99)
HCG SERPL-ACNC: HIGH MIU/ML
POTASSIUM SERPL-MCNC: 3.7 MMOL/L — SIGNIFICANT CHANGE UP (ref 3.5–5.3)
POTASSIUM SERPL-SCNC: 3.7 MMOL/L — SIGNIFICANT CHANGE UP (ref 3.5–5.3)
PROT SERPL-MCNC: 7.6 G/DL — SIGNIFICANT CHANGE UP (ref 6.6–8.7)
SODIUM SERPL-SCNC: 135 MMOL/L — SIGNIFICANT CHANGE UP (ref 135–145)

## 2023-04-30 PROCEDURE — 80053 COMPREHEN METABOLIC PANEL: CPT

## 2023-04-30 PROCEDURE — 36415 COLL VENOUS BLD VENIPUNCTURE: CPT

## 2023-04-30 PROCEDURE — 99284 EMERGENCY DEPT VISIT MOD MDM: CPT | Mod: 25

## 2023-04-30 PROCEDURE — 84702 CHORIONIC GONADOTROPIN TEST: CPT

## 2023-04-30 PROCEDURE — 86850 RBC ANTIBODY SCREEN: CPT

## 2023-04-30 PROCEDURE — 76801 OB US < 14 WKS SINGLE FETUS: CPT

## 2023-04-30 PROCEDURE — 87086 URINE CULTURE/COLONY COUNT: CPT

## 2023-04-30 PROCEDURE — T1013: CPT

## 2023-04-30 PROCEDURE — 86900 BLOOD TYPING SEROLOGIC ABO: CPT

## 2023-04-30 PROCEDURE — 81001 URINALYSIS AUTO W/SCOPE: CPT

## 2023-04-30 PROCEDURE — 85025 COMPLETE CBC W/AUTO DIFF WBC: CPT

## 2023-04-30 PROCEDURE — 86901 BLOOD TYPING SEROLOGIC RH(D): CPT

## 2023-04-30 PROCEDURE — 76801 OB US < 14 WKS SINGLE FETUS: CPT | Mod: 26

## 2023-04-30 RX ORDER — ONDANSETRON 8 MG/1
4 TABLET, FILM COATED ORAL ONCE
Refills: 0 | Status: COMPLETED | OUTPATIENT
Start: 2023-04-30 | End: 2023-04-30

## 2023-04-30 RX ADMIN — ONDANSETRON 4 MILLIGRAM(S): 8 TABLET, FILM COATED ORAL at 02:33

## 2023-05-01 LAB
CULTURE RESULTS: NO GROWTH — SIGNIFICANT CHANGE UP
SPECIMEN SOURCE: SIGNIFICANT CHANGE UP

## 2024-09-19 ENCOUNTER — TRANSCRIPTION ENCOUNTER (OUTPATIENT)
Age: 29
End: 2024-09-19

## 2024-09-20 ENCOUNTER — TRANSCRIPTION ENCOUNTER (OUTPATIENT)
Age: 29
End: 2024-09-20

## 2024-09-20 ENCOUNTER — RESULT REVIEW (OUTPATIENT)
Age: 29
End: 2024-09-20

## 2024-09-20 ENCOUNTER — INPATIENT (INPATIENT)
Facility: HOSPITAL | Age: 29
LOS: 2 days | Discharge: HOME | DRG: 779 | End: 2024-09-23
Attending: STUDENT IN AN ORGANIZED HEALTH CARE EDUCATION/TRAINING PROGRAM | Admitting: STUDENT IN AN ORGANIZED HEALTH CARE EDUCATION/TRAINING PROGRAM
Payer: MEDICAID

## 2024-09-20 VITALS
SYSTOLIC BLOOD PRESSURE: 110 MMHG | DIASTOLIC BLOOD PRESSURE: 72 MMHG | OXYGEN SATURATION: 99 % | RESPIRATION RATE: 18 BRPM | HEART RATE: 78 BPM | TEMPERATURE: 98 F

## 2024-09-20 VITALS
TEMPERATURE: 98 F | HEART RATE: 115 BPM | WEIGHT: 157.85 LBS | RESPIRATION RATE: 18 BRPM | OXYGEN SATURATION: 100 % | DIASTOLIC BLOOD PRESSURE: 88 MMHG | HEIGHT: 68 IN | SYSTOLIC BLOOD PRESSURE: 119 MMHG

## 2024-09-20 DIAGNOSIS — O03.4 INCOMPLETE SPONTANEOUS ABORTION WITHOUT COMPLICATION: ICD-10-CM

## 2024-09-20 LAB
ALBUMIN SERPL ELPH-MCNC: 4.1 G/DL — SIGNIFICANT CHANGE UP (ref 3.3–5.2)
ALP SERPL-CCNC: 43 U/L — SIGNIFICANT CHANGE UP (ref 40–120)
ALT FLD-CCNC: 15 U/L — SIGNIFICANT CHANGE UP
ANION GAP SERPL CALC-SCNC: 14 MMOL/L — SIGNIFICANT CHANGE UP (ref 5–17)
APTT BLD: 23.9 SEC — LOW (ref 24.5–35.6)
AST SERPL-CCNC: 12 U/L — SIGNIFICANT CHANGE UP
BASOPHILS # BLD AUTO: 0.06 K/UL — SIGNIFICANT CHANGE UP (ref 0–0.2)
BASOPHILS NFR BLD AUTO: 0.9 % — SIGNIFICANT CHANGE UP (ref 0–2)
BILIRUB SERPL-MCNC: 0.3 MG/DL — LOW (ref 0.4–2)
BLD GP AB SCN SERPL QL: SIGNIFICANT CHANGE UP
BUN SERPL-MCNC: 11.1 MG/DL — SIGNIFICANT CHANGE UP (ref 8–20)
CALCIUM SERPL-MCNC: 8.8 MG/DL — SIGNIFICANT CHANGE UP (ref 8.4–10.5)
CHLORIDE SERPL-SCNC: 104 MMOL/L — SIGNIFICANT CHANGE UP (ref 96–108)
CO2 SERPL-SCNC: 21 MMOL/L — LOW (ref 22–29)
CREAT SERPL-MCNC: 0.69 MG/DL — SIGNIFICANT CHANGE UP (ref 0.5–1.3)
EGFR: 120 ML/MIN/1.73M2 — SIGNIFICANT CHANGE UP
ELLIPTOCYTES BLD QL SMEAR: SLIGHT — SIGNIFICANT CHANGE UP
EOSINOPHIL # BLD AUTO: 0.06 K/UL — SIGNIFICANT CHANGE UP (ref 0–0.5)
EOSINOPHIL NFR BLD AUTO: 0.9 % — SIGNIFICANT CHANGE UP (ref 0–6)
GIANT PLATELETS BLD QL SMEAR: PRESENT — SIGNIFICANT CHANGE UP
GLUCOSE SERPL-MCNC: 137 MG/DL — HIGH (ref 70–99)
HCG SERPL-ACNC: 152.7 MIU/ML — HIGH
HCT VFR BLD CALC: 15.6 % — CRITICAL LOW (ref 34.5–45)
HCT VFR BLD CALC: 28.5 % — LOW (ref 34.5–45)
HGB BLD-MCNC: 4.9 G/DL — CRITICAL LOW (ref 11.5–15.5)
HGB BLD-MCNC: 9.9 G/DL — LOW (ref 11.5–15.5)
HYPOCHROMIA BLD QL: SIGNIFICANT CHANGE UP
INR BLD: 1.03 RATIO — SIGNIFICANT CHANGE UP (ref 0.85–1.18)
LYMPHOCYTES # BLD AUTO: 1.04 K/UL — SIGNIFICANT CHANGE UP (ref 1–3.3)
LYMPHOCYTES # BLD AUTO: 14.9 % — SIGNIFICANT CHANGE UP (ref 13–44)
MANUAL SMEAR VERIFICATION: SIGNIFICANT CHANGE UP
MCHC RBC-ENTMCNC: 21.6 PG — LOW (ref 27–34)
MCHC RBC-ENTMCNC: 26.9 PG — LOW (ref 27–34)
MCHC RBC-ENTMCNC: 31.4 GM/DL — LOW (ref 32–36)
MCHC RBC-ENTMCNC: 34.7 GM/DL — SIGNIFICANT CHANGE UP (ref 32–36)
MCV RBC AUTO: 68.7 FL — LOW (ref 80–100)
MCV RBC AUTO: 77.4 FL — LOW (ref 80–100)
MICROCYTES BLD QL: SIGNIFICANT CHANGE UP
MONOCYTES # BLD AUTO: 0.18 K/UL — SIGNIFICANT CHANGE UP (ref 0–0.9)
MONOCYTES NFR BLD AUTO: 2.6 % — SIGNIFICANT CHANGE UP (ref 2–14)
NEUTROPHILS # BLD AUTO: 5.56 K/UL — SIGNIFICANT CHANGE UP (ref 1.8–7.4)
NEUTROPHILS NFR BLD AUTO: 79.8 % — HIGH (ref 43–77)
PLAT MORPH BLD: NORMAL — SIGNIFICANT CHANGE UP
PLATELET # BLD AUTO: 211 K/UL — SIGNIFICANT CHANGE UP (ref 150–400)
PLATELET # BLD AUTO: 259 K/UL — SIGNIFICANT CHANGE UP (ref 150–400)
POIKILOCYTOSIS BLD QL AUTO: SLIGHT — SIGNIFICANT CHANGE UP
POLYCHROMASIA BLD QL SMEAR: SLIGHT — SIGNIFICANT CHANGE UP
POTASSIUM SERPL-MCNC: 3.5 MMOL/L — SIGNIFICANT CHANGE UP (ref 3.5–5.3)
POTASSIUM SERPL-SCNC: 3.5 MMOL/L — SIGNIFICANT CHANGE UP (ref 3.5–5.3)
PROT SERPL-MCNC: 6.9 G/DL — SIGNIFICANT CHANGE UP (ref 6.6–8.7)
PROTHROM AB SERPL-ACNC: 11.4 SEC — SIGNIFICANT CHANGE UP (ref 9.5–13)
RBC # BLD: 2.27 M/UL — LOW (ref 3.8–5.2)
RBC # BLD: 3.68 M/UL — LOW (ref 3.8–5.2)
RBC # FLD: 16 % — HIGH (ref 10.3–14.5)
RBC # FLD: 18.4 % — HIGH (ref 10.3–14.5)
RBC BLD AUTO: SIGNIFICANT CHANGE UP
SODIUM SERPL-SCNC: 139 MMOL/L — SIGNIFICANT CHANGE UP (ref 135–145)
VARIANT LYMPHS # BLD: 0.9 % — SIGNIFICANT CHANGE UP (ref 0–6)
WBC # BLD: 15.29 K/UL — HIGH (ref 3.8–10.5)
WBC # BLD: 6.97 K/UL — SIGNIFICANT CHANGE UP (ref 3.8–10.5)
WBC # FLD AUTO: 15.29 K/UL — HIGH (ref 3.8–10.5)
WBC # FLD AUTO: 6.97 K/UL — SIGNIFICANT CHANGE UP (ref 3.8–10.5)

## 2024-09-20 PROCEDURE — 99285 EMERGENCY DEPT VISIT HI MDM: CPT

## 2024-09-20 PROCEDURE — 88305 TISSUE EXAM BY PATHOLOGIST: CPT | Mod: 26

## 2024-09-20 PROCEDURE — 93010 ELECTROCARDIOGRAM REPORT: CPT

## 2024-09-20 PROCEDURE — 76830 TRANSVAGINAL US NON-OB: CPT | Mod: 26

## 2024-09-20 PROCEDURE — 76856 US EXAM PELVIC COMPLETE: CPT | Mod: 26

## 2024-09-20 RX ORDER — FENTANYL CITRATE-0.9 % NACL/PF 300MCG/30
25 PATIENT CONTROLLED ANALGESIA VIAL INJECTION
Refills: 0 | Status: DISCONTINUED | OUTPATIENT
Start: 2024-09-20 | End: 2024-09-23

## 2024-09-20 RX ORDER — FENTANYL CITRATE-0.9 % NACL/PF 300MCG/30
50 PATIENT CONTROLLED ANALGESIA VIAL INJECTION
Refills: 0 | Status: DISCONTINUED | OUTPATIENT
Start: 2024-09-20 | End: 2024-09-23

## 2024-09-20 RX ORDER — ONDANSETRON HCL/PF 4 MG/2 ML
4 VIAL (ML) INJECTION ONCE
Refills: 0 | Status: DISCONTINUED | OUTPATIENT
Start: 2024-09-20 | End: 2024-09-23

## 2024-09-20 RX ORDER — ACETAMINOPHEN 325 MG
1000 TABLET ORAL ONCE
Refills: 0 | Status: DISCONTINUED | OUTPATIENT
Start: 2024-09-20 | End: 2024-09-23

## 2024-09-20 RX ORDER — SODIUM CHLORIDE IRRIG SOLUTION 0.9 %
1000 SOLUTION, IRRIGATION IRRIGATION
Refills: 0 | Status: DISCONTINUED | OUTPATIENT
Start: 2024-09-20 | End: 2024-09-23

## 2024-09-20 RX ORDER — DOXYCYCLINE HYCLATE 100 MG
100 CAPSULE ORAL ONCE
Refills: 0 | Status: DISCONTINUED | OUTPATIENT
Start: 2024-09-20 | End: 2024-09-20

## 2024-09-20 RX ADMIN — Medication 25 MICROGRAM(S): at 20:10

## 2024-09-20 RX ADMIN — Medication 75 MILLILITER(S): at 21:24

## 2024-09-20 RX ADMIN — Medication 25 MICROGRAM(S): at 20:45

## 2024-09-20 NOTE — ED PROVIDER NOTE - CLINICAL SUMMARY MEDICAL DECISION MAKING FREE TEXT BOX
Annmarie Rosado MD, Attending  ddx includes, but is not limited to the following: anemia due to blood loss, retained products of conception, missed AB. lower suspicion for hemoperitoneum given pt did not get D&C   plan: bleeding and transfusion labs, transvaginal US, cardiac monitor, gyn consult.    update: see progress note.   ----

## 2024-09-20 NOTE — ED ADULT NURSE NOTE - NSFALLRISKINTERV_ED_ALL_ED
Assistance OOB with selected safe patient handling equipment if applicable/Communicate fall risk and risk factors to all staff, patient, and family/Orthostatic vital signs/Provide visual cue: yellow wristband, yellow gown, etc/Reinforce activity limits and safety measures with patient and family/Call bell, personal items and telephone in reach/Instruct patient to call for assistance before getting out of bed/chair/stretcher/Non-slip footwear applied when patient is off stretcher/Weed to call system/Physically safe environment - no spills, clutter or unnecessary equipment/Purposeful Proactive Rounding/Room/bathroom lighting operational, light cord in reach

## 2024-09-20 NOTE — ED ADULT TRIAGE NOTE - CHIEF COMPLAINT QUOTE
pt states she had a miscarriage 1 month ago, they did a procedure to remove products but she is bleeding alot  A&Ox3, resp wnl, color very pale, was found on the floor passed out by

## 2024-09-20 NOTE — BRIEF OPERATIVE NOTE - NSICDXBRIEFPOSTOP_GEN_ALL_CORE_FT
POST-OP DIAGNOSIS:  Retained products of conception after miscarriage 20-Sep-2024 19:35:47  Nereyda Rodriguez

## 2024-09-20 NOTE — ASU DISCHARGE PLAN (ADULT/PEDIATRIC) - ASU DC SPECIAL INSTRUCTIONSFT
-Take over the counter pain medication acetaminophen 975mg every 6 hours and ibuprofen 600mg every 6 hours as needed for pain.  -Follow-up with your doctor for a post-operative visit in 2 weeks.

## 2024-09-20 NOTE — ED ADULT NURSE NOTE - NSFALLCONCLUSION_ED_ALL_ED
Patient Education     First Aid: Chemical Exposure  Some chemicals cause burns. Others may be absorbed through skin or lungs, causing hidden damage. If possible, always refer to the particular chemical's Material Safety Data Sheet or contact the Poison Control Center (1-553.651.2533) for expert advice, In general, follow the steps below.  Step 1. Stop the source  · Remove the victim from contact with the chemical spill, airborne particles, or fumes. (Wear gloves or use other safety equipment as needed to protect yourself from exposure to the chemical.)  · Take off any clothes or jewelry that have been in contact with the chemical. Chemical injuries, just like heat burns, continue to worsen as long as the source is in contact with the body.  Step 2. Clear the lungs  · Take the victim to fresh air. This may mean going into another room or leaving the building.  · Perform rescue breathing or CPR, if needed.  Step 3. Flush the eyes  · Flush the affected eye with water for at least 15 minutes. Make sure the water is cool, especially if its source is an outside hose or eyewash station.  · Don't accidentally flush chemicals into an unaffected eye. Hold the head so that the injured eye is on the bottom. Flush from the nose downward.  Step 4. Clean the skin  · Brush water-activated chemicals, such as lime, from the skin, instead of using water. Be careful not to brush particles into the eyes.  · If the chemical does not react with water, flush the affected skin with cool water for at least 15 minutes. Make sure the water flow is not forceful enough to cause pain or break blisters.  · Don't brush away chemicals with your bare hands.  When to call your healthcare provider  Chemicals may cause serious damage not only to the outside of the body, but also to the inside. If absorbed into the bloodstream, chemicals may launch a silent attack on the kidneys or liver. Call your healthcare provider right away if any of the following  is true:  · A chemical has come into contact with the eyes, nose, or mouth.  · The Material Safety Data Sheet calls the chemical hazardous or likely to cause damage.  · The container label warns of corrosive contents, which can wear away skin.  · The chemical causes a large burn.  · There is difficulty breathing after exposure.  Call 911  Call 911 right away if the victim has:  · Symptoms of shock  · Trouble breathing  · Burns over a large area  While you wait for help  · Reassure the person.  · Do rescue breathing or CPR, if needed.    Date Last Reviewed: 12/1/2016  © 2869-2344 [employer] [address] [place] [address], [place] [address]. All rights reserved. This information is not intended as a substitute for professional medical care. Always follow your healthcare professional's instructions.            Fall Risk

## 2024-09-20 NOTE — ASU DISCHARGE PLAN (ADULT/PEDIATRIC) - CARE PROVIDER_API CALL
Cassie Schwab  Obstetrics and Gynecology  [address] [place] [id number]  Phone: [phone]  Fax: [phone]  Follow Up Time: 2 weeks

## 2024-09-20 NOTE — BRIEF OPERATIVE NOTE - OPERATION/FINDINGS
mid position uterus with significant amount of retained tissue. emptied by suction curettage using sono guidance

## 2024-09-20 NOTE — H&P ADULT - ASSESSMENT
Severe anemia due to blood loss 2/2 RPOC after medical management of missed ab  transfusion ongoing  will take to OR for D&C  Nereyda Narvaez

## 2024-09-20 NOTE — H&P ADULT - HISTORY OF PRESENT ILLNESS
Alfonzo  was used   28y  presents with heavy vaginal bleeding since , resulting in syncope today. She states that since arriving to the ED the bleeding has slowed down. Denies pelvic pain or fever.   Diagnosed with missed ab (approx 2 months per patient) 1 month ago, and she was given vaginal misoprostol. She was seen by Dr. Schwab 2 weeks ago and had tissue removed from cervix in the office.       Last Menstrual Period 6/15/24        OB/GYN HISTORY: 1nsvd, pcs 10 months ago due to placenta previa, patient states she had PPH but did not require transfusion    Hg 4.9 in ED, 2u prbc given   Sono consistent with RPOC

## 2024-09-20 NOTE — CONSULT NOTE ADULT - SUBJECTIVE AND OBJECTIVE BOX
OBGYN Consult  Alfonzo archibald was used   28y  presents with heavy vaginal bleeding since , resulting in syncope today. She states that since arriving to the ED the bleeding has slowed down. Denies pelvic pain or fever.   Diagnosed with missed ab (approx 2 months per patient) 1 month ago, and she was given vaginal misoprostol. She was seen by Dr. Schwab 2 weeks ago and had tissue removed from cervix in the office.       Last Menstrual Period 6/15/24        OB/GYN HISTORY: 1nsvd, pcs 10 months ago due to placenta previa, patient states she had PPH but did not require transfusion  PAST MEDICAL & SURGICAL HISTORY:  Ovarian tumor  removed 5 years ago      Asthma      No significant past surgical history        Allergies    No Known Allergies    Intolerances      MEDICATIONS  (STANDING):    MEDICATIONS  (PRN):    FAMILY HISTORY:    SOCIAL HISTORY:    Name of GYN Physician: Josselin      Vital Signs Last 24 Hrs  T(C): 36.8 (20 Sep 2024 09:37), Max: 36.8 (20 Sep 2024 09:37)  T(F): 98.2 (20 Sep 2024 09:37), Max: 98.2 (20 Sep 2024 09:37)  HR: 92 (20 Sep 2024 10:14) (92 - 115)  BP: 115/75 (20 Sep 2024 10:14) (115/75 - 119/88)  BP(mean): 85 (20 Sep 2024 10:14) (85 - 85)  RR: 18 (20 Sep 2024 10:14) (18 - 18)  SpO2: 100% (20 Sep 2024 10:14) (100% - 100%)    Parameters below as of 20 Sep 2024 10:14  Patient On (Oxygen Delivery Method): room air        PHYSICAL EXAM:      Constitutional: alert and oriented x 3, transfusion currently running in      Respiratory: clear    Cardiovascular: regular rate and rhythm    Gastrointestinal: soft, non tender, + bowel sounds. No hepatosplenomegaly, no palpable masses    Genitourinary: no blood seen on pad    Rectal:     Extremities:    Neurological:    Skin:    Lymph Nodes:        LABS:                        4.9    6.97  )-----------( 259      ( 20 Sep 2024 09:50 )             15.6     09-20    139  |  104  |  11.1  ----------------------------<  137[H]  3.5   |  21.0[L]  |  0.69    Ca    8.8      20 Sep 2024 09:50    TPro  6.9  /  Alb  4.1  /  TBili  0.3[L]  /  DBili  x   /  AST  12  /  ALT  15  /  AlkPhos  43  09-20    PT/INR - ( 20 Sep 2024 09:50 )   PT: 11.4 sec;   INR: 1.03 ratio         PTT - ( 20 Sep 2024 09:50 )  PTT:23.9 sec  Urinalysis Basic - ( 20 Sep 2024 09:50 )    Color: x / Appearance: x / SG: x / pH: x  Gluc: 137 mg/dL / Ketone: x  / Bili: x / Urobili: x   Blood: x / Protein: x / Nitrite: x   Leuk Esterase: x / RBC: x / WBC x   Sq Epi: x / Non Sq Epi: x / Bacteria: x      HC        RADIOLOGY & ADDITIONAL STUDIES:

## 2024-09-20 NOTE — CONSULT NOTE ADULT - ASSESSMENT
28  LMp 6/15, presents with severe anemia due to heavy bleeding after medical management of miscarriage  currently received transfusion and fluid resuscitation  would obtain sono to eval for RPOC, if present I discussed with the patient that she may need D&C procedure   Nereyda Narvaez

## 2024-09-20 NOTE — BRIEF OPERATIVE NOTE - NSICDXBRIEFPROCEDURE_GEN_ALL_CORE_FT
PROCEDURES:  Dilation and curettage, uterus, using suction, for incomplete  20-Sep-2024 19:35:26  Nereyda Rodriguez

## 2024-09-20 NOTE — BRIEF OPERATIVE NOTE - NSICDXBRIEFPREOP_GEN_ALL_CORE_FT
PRE-OP DIAGNOSIS:  Retained products of conception after miscarriage 20-Sep-2024 19:35:37  Nereyda Rodriguez

## 2024-09-20 NOTE — ED ADULT NURSE NOTE - OBJECTIVE STATEMENT
c/o heavy vaginal bleeding with clots x 3 days. Pt stated one month ago she received 4 doses of vaginal medication for treatment of intrauterine demise, no D and C c/o heavy vaginal bleeding with clots x 3 days. Pt stated one month ago she received 4 doses of vaginal medication for treatment of intrauterine demise, no D and C. Pt with episode of syncope today. Currently denies HA, dizziness, SOB, N/V/D, CP, palpitations, fevers, chills. Pt Aox4, speaking coherently, respirations even and unlabored on RA. CM and  in place.

## 2024-09-20 NOTE — ED PROVIDER NOTE - OBJECTIVE STATEMENT
29 yo F  PW heavy vaginal bleeding since  resulting in syncope today. Pt was diagnosed with missed  at roughly 2 months gestational age about 1 month ago. Pt was treated with vaginal misoprostol "four times" and had tissue removed from cervix, no D&C. Pt states bleeding was intermittent and not heavy until 3 days ago. Pt not tachycardic, not hypotensive, AOx3 providing history in the ED. Pt states bleeding has slowed down since ED arrival.

## 2024-09-20 NOTE — ED PROVIDER NOTE - PHYSICAL EXAMINATION
Annmarie Rosado MD Attending  GEN: Patient awake alert NAD.   HEENT: normocephalic, atraumatic, EOMI, no scleral icterus, moist MM + sublingual pallor, + conjunctival pallor  CARDIAC: RRR, S1, S2   PULM: CTA B/L    ABD: soft NT, ND   MSK: Moving all extremities, no edema. 5/5 strength and full ROM in all extremities.     NEURO: A&Ox3,no focal neurological deficits, CN 2-12 grossly intact  SKIN: + pale, dry, no rash.

## 2024-09-20 NOTE — ED ADULT NURSE NOTE - IS THE PATIENT ABLE TO BE SCREENED?
PT SLEPT 5  HOURS. PT SHOWS NO SIGNS OF DISTRESS. PRESCRIBED MEDICATION GIVEN AND PT 
TOLERATED IT WELL.PT COOPERATIVE WITH CARE. PT SHOWING CRYING AND THEN SMILING. PT NEEDS 
REORIENTATION, REDIRECTION AND DISTRACTION. SAFETY AND COMFORT PROVIDED. WILL ENDORSE TO 
DAYSHIFT NURSE FOR CONTINUITY OF CARE . Yes

## 2024-09-20 NOTE — ED PROVIDER NOTE - PROGRESS NOTE DETAILS
Annmarie Rosado MD, Attending  GYN requesting keep pt NPO for add on D&C. Annmarie Rosado MD, Attending  Pt remained stable during ED stay (Aox3, no tachycardia, no hypotension, well appearing). GYN requesting keep pt NPO for add on D&C.

## 2024-09-23 PROCEDURE — T1013: CPT

## 2024-09-23 PROCEDURE — 86850 RBC ANTIBODY SCREEN: CPT

## 2024-09-23 PROCEDURE — 85730 THROMBOPLASTIN TIME PARTIAL: CPT

## 2024-09-23 PROCEDURE — P9016: CPT

## 2024-09-23 PROCEDURE — 99285 EMERGENCY DEPT VISIT HI MDM: CPT | Mod: 25

## 2024-09-23 PROCEDURE — 76856 US EXAM PELVIC COMPLETE: CPT

## 2024-09-23 PROCEDURE — 36415 COLL VENOUS BLD VENIPUNCTURE: CPT

## 2024-09-23 PROCEDURE — 86923 COMPATIBILITY TEST ELECTRIC: CPT

## 2024-09-23 PROCEDURE — 86901 BLOOD TYPING SEROLOGIC RH(D): CPT

## 2024-09-23 PROCEDURE — 86900 BLOOD TYPING SEROLOGIC ABO: CPT

## 2024-09-23 PROCEDURE — 76830 TRANSVAGINAL US NON-OB: CPT

## 2024-09-23 PROCEDURE — 93005 ELECTROCARDIOGRAM TRACING: CPT

## 2024-09-23 PROCEDURE — 88305 TISSUE EXAM BY PATHOLOGIST: CPT

## 2024-09-23 PROCEDURE — 36430 TRANSFUSION BLD/BLD COMPNT: CPT

## 2024-09-23 PROCEDURE — 84702 CHORIONIC GONADOTROPIN TEST: CPT

## 2024-09-23 PROCEDURE — 85027 COMPLETE CBC AUTOMATED: CPT

## 2024-09-23 PROCEDURE — 85025 COMPLETE CBC W/AUTO DIFF WBC: CPT

## 2024-09-23 PROCEDURE — 80053 COMPREHEN METABOLIC PANEL: CPT

## 2024-09-23 PROCEDURE — 85610 PROTHROMBIN TIME: CPT

## 2024-09-25 LAB — SURGICAL PATHOLOGY STUDY: SIGNIFICANT CHANGE UP

## 2025-06-10 NOTE — ASU PREOP CHECKLIST - BSA (M2)
1.85
You were seen in our department for Vertigo      All your lab work and imaging results that were done here in the Emergency Department will be attached to your discharge paperwork    Follow up with your Primary Care Doctor  in 48-72 hours for further monitoring.  Follow up with Neurologist within 1 week for further evaluation.    Please take meclizine 25mg 4 times a day as needed for dizziness,      if you develop any chest pain, dizziness, high fevers, weakness, numbness, tingling, vision changes, or any worsening symptoms return to our ED for evaluation.

## (undated) DEVICE — VACUUM CURETTE BERKLEY OLYMPUS CURVED 12MM

## (undated) DEVICE — VACUUM CURETTE BERKLEY OLYMPUS CURVED 8MM

## (undated) DEVICE — NDL COUNTER FOAM AND MAGNET 40-70

## (undated) DEVICE — PACK LITHOTOMY

## (undated) DEVICE — VACUUM CURETTE BERKLEY OLYMPUS CURVED 7MM

## (undated) DEVICE — LAP PAD W RING 18 X 18"

## (undated) DEVICE — DRAPE LIGHT HANDLE COVER (GREEN)

## (undated) DEVICE — PREP TRAY DRY SKIN PREP SCRUB

## (undated) DEVICE — PREP DYNA-HEX CHG 4% 4OZ BOTTLE (BACTOSHIELD)

## (undated) DEVICE — DRAPE 1/2 SHEET 40X57"

## (undated) DEVICE — URETERAL CATH RED RUBBER 16FR (ORANGE)

## (undated) DEVICE — TUBING ASPIRATION HANDLE

## (undated) DEVICE — TUBING MEDI-VAC W MAXIGRIP CONNECTORS 1/4"X6'

## (undated) DEVICE — GLV 8.5 PROTEXIS (WHITE)

## (undated) DEVICE — VACUUM CURETTE BERKLEY OLYMPUS STRAIGHT 12MM

## (undated) DEVICE — SUCTION YANKAUER TAPERED BULBOUS NO VENT

## (undated) DEVICE — VACUUM CURETTE BERKLEY OLYMPUS CURVED 9MM

## (undated) DEVICE — VENODYNE/SCD SLEEVE CALF MEDIUM

## (undated) DEVICE — WARMING BLANKET UPPER ADULT

## (undated) DEVICE — TUBING GYRUS ACMI COLLECTION SET 6FT